# Patient Record
Sex: MALE | URBAN - METROPOLITAN AREA
[De-identification: names, ages, dates, MRNs, and addresses within clinical notes are randomized per-mention and may not be internally consistent; named-entity substitution may affect disease eponyms.]

---

## 2023-03-20 ENCOUNTER — APPOINTMENT (OUTPATIENT)
Dept: URBAN - METROPOLITAN AREA CLINIC 254 | Age: 43
Setting detail: DERMATOLOGY
End: 2023-03-20

## 2023-03-20 VITALS — WEIGHT: 200 LBS | HEIGHT: 70 IN

## 2023-03-20 DIAGNOSIS — L91.0 HYPERTROPHIC SCAR: ICD-10-CM

## 2023-03-20 DIAGNOSIS — L81.0 POSTINFLAMMATORY HYPERPIGMENTATION: ICD-10-CM

## 2023-03-20 PROBLEM — D23.5 OTHER BENIGN NEOPLASM OF SKIN OF TRUNK: Status: ACTIVE | Noted: 2023-03-20

## 2023-03-20 PROBLEM — D23.61 OTHER BENIGN NEOPLASM OF SKIN OF RIGHT UPPER LIMB, INCLUDING SHOULDER: Status: ACTIVE | Noted: 2023-03-20

## 2023-03-20 PROCEDURE — OTHER ADDITIONAL NOTES: OTHER

## 2023-03-20 PROCEDURE — OTHER MIPS QUALITY: OTHER

## 2023-03-20 PROCEDURE — 99202 OFFICE O/P NEW SF 15 MIN: CPT | Mod: 25

## 2023-03-20 PROCEDURE — OTHER COUNSELING: OTHER

## 2023-03-20 PROCEDURE — OTHER INTRALESIONAL KENALOG: OTHER

## 2023-03-20 PROCEDURE — 11900 INJECT SKIN LESIONS </W 7: CPT

## 2023-03-20 ASSESSMENT — LOCATION ZONE DERM
LOCATION ZONE: LEG
LOCATION ZONE: NECK
LOCATION ZONE: TRUNK

## 2023-03-20 ASSESSMENT — LOCATION DETAILED DESCRIPTION DERM
LOCATION DETAILED: RIGHT INFERIOR POSTERIOR NECK
LOCATION DETAILED: RIGHT MEDIAL UPPER BACK
LOCATION DETAILED: RIGHT BUTTOCK
LOCATION DETAILED: RIGHT ANTERIOR PROXIMAL THIGH

## 2023-03-20 ASSESSMENT — LOCATION SIMPLE DESCRIPTION DERM
LOCATION SIMPLE: RIGHT BUTTOCK
LOCATION SIMPLE: POSTERIOR NECK
LOCATION SIMPLE: RIGHT UPPER BACK
LOCATION SIMPLE: RIGHT THIGH

## 2023-03-20 NOTE — PROCEDURE: ADDITIONAL NOTES
Detail Level: Detailed
Additional Notes: - Reviewed steroids will not improve the condition. Explained it may have been folliculitis or eczema previously, but no rash is actively present on today’s exam. \\n- Can continue to utilize triamcinolone cream bid until resolve or no more than 14 days per month for itch.\\n- Reassured hyperpigmentation will fade over time.
Render Risk Assessment In Note?: no

## 2023-03-20 NOTE — HPI: RASH
Is This A New Presentation, Or A Follow-Up?: Rash
Additional History: Was itchy at first but no itch currently. Tried triamcinolone .1% bid for the past 3 months. Also using hydrocortisone 0.2% bid as well.

## 2023-11-29 ENCOUNTER — TRANSCRIBE ORDERS (OUTPATIENT)
Dept: OTHER | Age: 43
End: 2023-11-29

## 2023-11-29 DIAGNOSIS — M25.552 BILATERAL HIP PAIN: Primary | ICD-10-CM

## 2023-11-29 DIAGNOSIS — M25.551 BILATERAL HIP PAIN: Primary | ICD-10-CM

## 2023-11-29 DIAGNOSIS — M65.352 TRIGGER LITTLE FINGER OF LEFT HAND: Primary | ICD-10-CM

## 2023-12-11 NOTE — TELEPHONE ENCOUNTER
Action December 10, 2023 11:06 PM MT   Action Taken Sent a request to  and [facility] for imaging.       DIAGNOSIS: Bilateral Hip Pain - AVN  of RT Femur   APPOINTMENT DATE: 12/28/2023   NOTES STATUS DETAILS   OFFICE NOTE from referring provider Care Everywhere 11/28/2023 - Jese Dwyer MD - [facility]   OFFICE NOTE from other specialist Care Everywhere 11/13/2023 - Jordan Mckay PA-C - [facility]   MEDICATION LIST Care Everywhere    MRI Complete - in PACS [facility]:  11/09/2023 - RT Hip  11/09/2023 - LT Hip   CT SCAN Complete  [facility]:  11/26/2021 - CT Abdomen/Pelvis   ULTRASOUND Complete  [facility]:  01/20/2022 - RT HIP/Superficial Mass   XRAYS (IMAGES & REPORTS) Complete  [facility]:  11/08/2023 - RT Hip/Pelvis

## 2023-12-13 NOTE — TELEPHONE ENCOUNTER
Action 12.19.23    Action Taken Faxed urgent requests to [facility] and [place] for any/all Hand/Finger imaging.    Imaging received and resolved to [facility]       DIAGNOSIS: Trigger little finger of left hand    APPOINTMENT DATE: 12/20/2023   NOTES STATUS DETAILS   OFFICE NOTE from referring provider Care Everywhere Dr. Jese Dwyer  [facility]-11/29/23   OFFICE NOTE from other specialist Care Everywhere [facility]-11/12/2013  Dr. Marcie Romero   DISCHARGE SUMMARY from hospital Care Everywhere [facility]-11/05/2013  Dr. Marcie Romero   OPERATIVE REPORT Care Everywhere [facility]-11/05/2013  Dr. Marcie Romero     MEDICATION LIST Care Everywhere    XRAYS (IMAGES & REPORTS) [facility] Xray- of fingers from [facility] AND [place]

## 2023-12-20 ENCOUNTER — PRE VISIT (OUTPATIENT)
Dept: ORTHOPEDICS | Facility: CLINIC | Age: 43
End: 2023-12-20

## 2023-12-27 DIAGNOSIS — M25.552 LEFT HIP PAIN: Primary | ICD-10-CM
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
## 2023-12-28 ENCOUNTER — ANCILLARY PROCEDURE (OUTPATIENT)
Dept: GENERAL RADIOLOGY | Facility: CLINIC | Age: 43
End: 2023-12-28
Attending: ORTHOPAEDIC SURGERY
Payer: COMMERCIAL

## 2023-12-28 ENCOUNTER — PRE VISIT (OUTPATIENT)
Dept: ORTHOPEDICS | Facility: CLINIC | Age: 43
End: 2023-12-28

## 2023-12-28 ENCOUNTER — LAB (OUTPATIENT)
Dept: LAB | Facility: CLINIC | Age: 43
End: 2023-12-28
Payer: COMMERCIAL

## 2023-12-28 ENCOUNTER — ANCILLARY PROCEDURE (OUTPATIENT)
Dept: CT IMAGING | Facility: CLINIC | Age: 43
End: 2023-12-28
Attending: ORTHOPAEDIC SURGERY
Payer: COMMERCIAL

## 2023-12-28 ENCOUNTER — OFFICE VISIT (OUTPATIENT)
Dept: ORTHOPEDICS | Facility: CLINIC | Age: 43
End: 2023-12-28
Attending: SPECIALIST
Payer: COMMERCIAL

## 2023-12-28 VITALS — WEIGHT: 204 LBS

## 2023-12-28 DIAGNOSIS — M87.9 OSTEONECROSIS (H): Primary | ICD-10-CM

## 2023-12-28 DIAGNOSIS — M25.551 BILATERAL HIP PAIN: ICD-10-CM

## 2023-12-28 DIAGNOSIS — M25.552 BILATERAL HIP PAIN: ICD-10-CM

## 2023-12-28 DIAGNOSIS — M25.552 LEFT HIP PAIN: ICD-10-CM

## 2023-12-28 PROBLEM — G47.33 OSA (OBSTRUCTIVE SLEEP APNEA): Status: ACTIVE | Noted: 2023-02-17

## 2023-12-28 PROBLEM — R22.0 SWELLING OF HEAD: Status: ACTIVE | Noted: 2023-12-28

## 2023-12-28 LAB
DRVVT SCREEN RATIO: 1.04
FACT IX ACT/NOR PPP: 120 %
FACT VIII ACT/NOR PPP: 92 % (ref 55–200)
HCYS SERPL-SCNC: 19.6 UMOL/L (ref 0–15)
INR PPP: 1.02 (ref 0.85–1.15)
LA PPP-IMP: NEGATIVE
LUPUS INTERPRETATION: NORMAL
PTT RATIO: 1.19
THROMBIN TIME: 18.1 SECONDS (ref 13–19)

## 2023-12-28 PROCEDURE — 83090 ASSAY OF HOMOCYSTEINE: CPT | Performed by: ORTHOPAEDIC SURGERY

## 2023-12-28 PROCEDURE — 73501 X-RAY EXAM HIP UNI 1 VIEW: CPT | Mod: LT | Performed by: RADIOLOGY

## 2023-12-28 PROCEDURE — 85240 CLOT FACTOR VIII AHG 1 STAGE: CPT | Performed by: ORTHOPAEDIC SURGERY

## 2023-12-28 PROCEDURE — 86147 CARDIOLIPIN ANTIBODY EA IG: CPT | Performed by: ORTHOPAEDIC SURGERY

## 2023-12-28 PROCEDURE — 81240 F2 GENE: CPT

## 2023-12-28 PROCEDURE — 85250 CLOT FACTOR IX PTC/CHRSTMAS: CPT | Performed by: ORTHOPAEDIC SURGERY

## 2023-12-28 PROCEDURE — 85390 FIBRINOLYSINS SCREEN I&R: CPT | Mod: 26 | Performed by: PATHOLOGY

## 2023-12-28 PROCEDURE — 72192 CT PELVIS W/O DYE: CPT | Mod: GC | Performed by: RADIOLOGY

## 2023-12-28 PROCEDURE — 99000 SPECIMEN HANDLING OFFICE-LAB: CPT | Performed by: PATHOLOGY

## 2023-12-28 PROCEDURE — G0452 MOLECULAR PATHOLOGY INTERPR: HCPCS | Mod: 26 | Performed by: PATHOLOGY

## 2023-12-28 PROCEDURE — 85420 FIBRINOLYTIC PLASMINOGEN: CPT | Performed by: ORTHOPAEDIC SURGERY

## 2023-12-28 PROCEDURE — 36415 COLL VENOUS BLD VENIPUNCTURE: CPT | Performed by: PATHOLOGY

## 2023-12-28 PROCEDURE — 99204 OFFICE O/P NEW MOD 45 MIN: CPT | Performed by: ORTHOPAEDIC SURGERY

## 2023-12-28 PROCEDURE — 85613 RUSSELL VIPER VENOM DILUTED: CPT | Performed by: ORTHOPAEDIC SURGERY

## 2023-12-28 PROCEDURE — 85306 CLOT INHIBIT PROT S FREE: CPT | Performed by: ORTHOPAEDIC SURGERY

## 2023-12-28 PROCEDURE — 85303 CLOT INHIBIT PROT C ACTIVITY: CPT | Performed by: ORTHOPAEDIC SURGERY

## 2023-12-28 RX ORDER — CELECOXIB 200 MG/1
200 CAPSULE ORAL 2 TIMES DAILY
Qty: 60 CAPSULE | Refills: 0 | Status: SHIPPED | OUTPATIENT
Start: 2023-12-28 | End: 2024-02-15

## 2023-12-28 ASSESSMENT — ACTIVITIES OF DAILY LIVING (ADL)
ADL_MEAN: 1.71
ADL_SUBSCALE_SCORE: 57.25
ADL_SUM: 0
ADL_COUNT: 17
ADL_MEAN: 0
ADL_SUM: 29
ADL_COUNT: 17
ADL_SUBSCALE_SCORE: 100

## 2023-12-28 ASSESSMENT — HOOS S4: HOW SEVERE IS YOUR HIP JOINT STIFFNESS AFTER FIRST WAKENING IN THE MORNING?: SEVERE

## 2023-12-29 LAB
PLG ACT/NOR PPP CHRO: 109 % (ref 80–133)
PROT C ACT/NOR PPP CHRO: 109 % (ref 70–170)
PROT S FREE AG ACT/NOR PPP IA: 89 % (ref 70–148)

## 2024-01-02 LAB
CARDIOLIPIN IGG SER IA-ACNC: 20 GPL-U/ML
CARDIOLIPIN IGG SER IA-ACNC: ABNORMAL
CARDIOLIPIN IGM SER IA-ACNC: 22 MPL-U/ML
CARDIOLIPIN IGM SER IA-ACNC: ABNORMAL

## 2024-01-10 NOTE — PROGRESS NOTES
[facility]  Orthopaedic Surgery, Joint Replacement Consultation  by Kenyon Ayala M.D.    Neil Pompa MRN# [id number]    YOB: 1980     Requesting physician: Jese Waterman     Background history:  DX:  Osteonecrosis of the femoral head, bilaterally, Arco stage II, greater than 30% of femoral head involvement bilaterally.  Poss related to HIV medication (protease inhibitors).      TREATMENTS:  11/5/2013, Right ring finger trigger finger release and right ring finger mass excision, (NABEEL Romero), [facility]  5/31/2014, Right C6-C7 hemilaminectomy and microdiskectomy, (Roxanna PATRICK) [facility]  11/3/3021, Right forehead soft tissue mass excision, ([facility]    Virtual visit today to review CT scan and laboratory studies investigating etiology of osteonecrosis.  He notes that he is also having right knee pain.  He also requested renewal of narcotic pain medicine which I declined.  I advised use of anti-inflammatory agents instead.    CT scans very large necrotic arc of involvement but no evidence of subchondral fracture or collapse.    Investigations into thrombophilia and hypofibrinolysis show a number of abnormalities that may or may not be of clinical significance.  I will refer the patient to our blood clotting disorders clinic for further discussion regarding these issues and lab test results.           Assessment and Plan:   Assessment and Plan:  Osteonecrosis bilateral femoral heads greater than 30% involvement bilaterally.  Arco stage 2 bilaterally.  Etiology unknown but may be related to HIV medication.  No significant history of steroid or ethanol exposure.  Advise proceeding with core decompression and bone marrow aspirate concentrate bilaterally.  He is a candidate potentially for the BAT ON multisite randomized trial of bone marrow aspirate concentrate clinical trial study.  He is willing to participate.  He is also candidate for the MOS T  MRI clinical trial study.  He would like to participate as well.  I will have our research  contact patient for consent process.  Patients may be on both trials concurrently as they are not mutually exclusive.  As patient is having pain in his right knee joint, I will arrange for MRI examination of both knees to screen for osteonecrosis.  Given the abnormal blood clotting lab test, staff will arrange for hematology referral consultation to review test and determine any clinical significance.  HIV positive, on genvoya x 8 years    Kenyon Ayala MD  [facility] Family Professor  Oncology and Adult Reconstructive Surgery  Dept Orthopaedic Surgery, [facility]  [phone] office, [phone] pager  [email or web address]    .      Virtual-Visit Details    Type of service:  Video Visit  Visit total duration (including visit time, pre and post visit work time as documented above on the same day of service): 20  min  Video start time: 1625  Video end time:  1645  Non video work time: 05 min  Originating Location (pt. Location): Home  Distant Location (provider location):  [facility]   Platform used for Virtual Visit: [employer]

## 2024-01-11 ENCOUNTER — VIRTUAL VISIT (OUTPATIENT)
Dept: ORTHOPEDICS | Facility: CLINIC | Age: 44
End: 2024-01-11
Payer: COMMERCIAL

## 2024-01-11 VITALS — HEIGHT: 70 IN | BODY MASS INDEX: 29.27 KG/M2

## 2024-01-11 DIAGNOSIS — M87.9 OSTEONECROSIS (H): Primary | ICD-10-CM

## 2024-01-11 PROCEDURE — 99214 OFFICE O/P EST MOD 30 MIN: CPT | Mod: VID | Performed by: ORTHOPAEDIC SURGERY

## 2024-01-11 ASSESSMENT — PAIN SCALES - GENERAL: PAINLEVEL: EXTREME PAIN (8)

## 2024-01-11 NOTE — NURSING NOTE
Is the patient currently in the state of [place]? YES    Visit mode:VIDEO    If the visit is dropped, the patient can be reconnected by: VIDEO VISIT: Text to cell phone:   Telephone Information:   Mobile [phone]       Will anyone else be joining the visit? NO  (If patient encounters technical issues they should call [phone] :[id number])    How would you like to obtain your AVS? MyChart    Are changes needed to the allergy or medication list? No    Reason for visit: RECHECK    Medications and allergies have been reviewed.     Len FISHER

## 2024-01-11 NOTE — LETTER
1/11/2024         RE: Neil Pompa  [address]  [place] [address]        Dear Colleague,    Thank you for referring your patient, Neil Pompa, to the [facility] [place]. Please see a copy of my visit note below.        [facility]  Orthopaedic Surgery, Joint Replacement Consultation  by Kenyon Ayala M.D.    Neil Pompa MRN# [id number]    YOB: 1980     Requesting physician: Jese Waterman     Background history:  DX:  Osteonecrosis of the femoral head, bilaterally, Arco stage II, greater than 30% of femoral head involvement bilaterally.  Poss related to HIV medication (protease inhibitors).      TREATMENTS:  11/5/2013, Right ring finger trigger finger release and right ring finger mass excision, (NABEEL Romeor[place] [facility]  5/31/2014, Right C6-C7 hemilaminectomy and microdiskectomy, (Roxanna PATRICK) [facility]  11/3/3021, Right forehead soft tissue mass excision, (MADELIN [facility]    Virtual visit today to review CT scan and laboratory studies investigating etiology of osteonecrosis.  He notes that he is also having right knee pain.  He also requested renewal of narcotic pain medicine which I declined.  I advised use of anti-inflammatory agents instead.    CT scans very large necrotic arc of involvement but no evidence of subchondral fracture or collapse.    Investigations into thrombophilia and hypofibrinolysis show a number of abnormalities that may or may not be of clinical significance.  I will refer the patient to our blood clotting disorders clinic for further discussion regarding these issues and lab test results.           Assessment and Plan:   Assessment and Plan:  Osteonecrosis bilateral femoral heads greater than 30% involvement bilaterally.  Arco stage 2 bilaterally.  Etiology unknown but may be related to HIV medication.  No significant history of steroid or ethanol exposure.  Advise proceeding with  core decompression and bone marrow aspirate concentrate bilaterally.  He is a candidate potentially for the BAT ON multisite randomized trial of bone marrow aspirate concentrate clinical trial study.  He is willing to participate.  He is also candidate for the MOS T MRI clinical trial study.  He would like to participate as well.  I will have our research  contact patient for consent process.  Patients may be on both trials concurrently as they are not mutually exclusive.  As patient is having pain in his right knee joint, I will arrange for MRI examination of both knees to screen for osteonecrosis.  Given the abnormal blood clotting lab test, staff will arrange for hematology referral consultation to review test and determine any clinical significance.  HIV positive, on genvoya x 8 years    Kenyon Ayala MD  [facility] Family Professor  Oncology and Adult Reconstructive Surgery  Dept Orthopaedic Surgery, [facility]  [phone] office, [phone] pager  [email or web address]    .      Virtual-Visit Details    Type of service:  Video Visit  Visit total duration (including visit time, pre and post visit work time as documented above on the same day of service): 20  min  Video start time: 1625  Video end time:  1645  Non video work time: 05 min  Originating Location (pt. Location): Home  Distant Location (provider location):  [facility]THOPEDIC [facility]   Platform used for Virtual Visit: [employer]

## 2024-01-11 NOTE — PROGRESS NOTES
"Virtual Visit Details    Type of service:  Video Visit   Video Start Time: {video visit start/end time for provider to select:257017}  Video End Time:{video visit start/end time for provider to select:237080}    Originating Location (pt. Location): {video visit patient location:203899::\"Home\"}  {PROVIDER LOCATION On-site should be selected for visits conducted from your clinic location or adjoining [facility] hospital, academic office, or other nearby [facility] building. Off-site should be selected for all other provider locations, including home:914332}  Distant Location (provider location):  {virtual location provider:167399}  Platform used for Video Visit: {Virtual Visit Platforms:553987::\"[employer]\"}    "

## 2024-01-12 ENCOUNTER — TELEPHONE (OUTPATIENT)
Dept: ORTHOPEDICS | Facility: CLINIC | Age: 44
End: 2024-01-12
Payer: COMMERCIAL

## 2024-01-12 NOTE — TELEPHONE ENCOUNTER
Left Voicemail (1st Attempt) for the patient to call back and schedule the following:    Appointment type: Virtual Return  Provider: Jamie  Return date: Next available after MRI's are done  Specialty phone number: [phone]  Additional appointment(s) needed: 2 MRI's  Additonal Notes:

## 2024-01-15 ENCOUNTER — TELEPHONE (OUTPATIENT)
Dept: ORTHOPEDICS | Facility: CLINIC | Age: 44
End: 2024-01-15
Payer: COMMERCIAL

## 2024-01-15 NOTE — TELEPHONE ENCOUNTER
ANTONINA to schedule 2 mris and a virtual visit with Jamie to go over the results. Contact info provided. MAX attempts made to schedule

## 2024-01-16 LAB
FACTOR 2 INTERPRETATION: NORMAL
FACTOR V INTERPRETATION: NORMAL
LAB DIRECTOR COMMENTS: NORMAL
LAB DIRECTOR DISCLAIMER: NORMAL
LAB DIRECTOR INTERPRETATION: NORMAL
LAB DIRECTOR METHODOLOGY: NORMAL
LAB DIRECTOR RESULTS: NORMAL

## 2024-02-14 ENCOUNTER — TELEPHONE (OUTPATIENT)
Dept: ORTHOPEDICS | Facility: CLINIC | Age: 44
End: 2024-02-14
Payer: COMMERCIAL

## 2024-02-14 DIAGNOSIS — M25.552 BILATERAL HIP PAIN: ICD-10-CM

## 2024-02-14 DIAGNOSIS — M25.551 BILATERAL HIP PAIN: ICD-10-CM

## 2024-02-14 DIAGNOSIS — M87.9 OSTEONECROSIS (H): ICD-10-CM

## 2024-02-14 NOTE — TELEPHONE ENCOUNTER
[facility] Call Center    Phone Message    May a detailed message be left on voicemail: yes     Reason for Call: Medication Refill Request    Has the patient contacted the pharmacy for the refill? Yes   Name of medication being requested: Celebrex  Provider who prescribed the medication: Dr. Ayala  Pharmacy: [facility] on [address] in [place]  Date medication is needed: as soon as possible     Going out of town and needs refill as soon as you can.    Action Taken: Message routed to:  [facility] ortho    Travel Screening: Not Applicable

## 2024-02-15 RX ORDER — CELECOXIB 200 MG/1
200 CAPSULE ORAL 2 TIMES DAILY
Qty: 60 CAPSULE | Refills: 0 | Status: SHIPPED | OUTPATIENT
Start: 2024-02-15 | End: 2024-03-25

## 2024-02-15 NOTE — TELEPHONE ENCOUNTER
- A call was placed to the patient.     - I told him we would refill medication one more time then we ask that he reach out to his PCP for future prescriptions.     - Patient verbalized understanding of plan and all questions were answered. Call back number to clinic was given and patient was told to call if they had an further questions.

## 2024-03-14 DIAGNOSIS — M25.551 BILATERAL HIP PAIN: ICD-10-CM

## 2024-03-14 DIAGNOSIS — M25.552 BILATERAL HIP PAIN: ICD-10-CM

## 2024-03-14 DIAGNOSIS — M87.9 OSTEONECROSIS (H): ICD-10-CM

## 2024-03-15 RX ORDER — CELECOXIB 200 MG/1
200 CAPSULE ORAL 2 TIMES DAILY
Qty: 60 CAPSULE | Refills: 0 | OUTPATIENT
Start: 2024-03-15

## 2024-03-15 NOTE — TELEPHONE ENCOUNTER
- A call was placed to the patient.     - He said he did not request this medication. I let him know that if he needed refills moving forward that he should reach out to his primary care provider for ongoing prescriptions.  He agreed to do so.    - Patient verbalized understanding of plan and all questions were answered. Call back number to clinic was given and patient was told to call if they had an further questions.

## 2024-03-15 NOTE — PROGRESS NOTES
[facility]  [address] [place] [id number]  Main: [phone], Fax: [phone]    Patient seen at: [facility] at [address] [place] [address]    Outpatient Visit Note:    Patient: Neil Pompa  MRN: [id number]  : 1980  MASON: 2024  Location of this writer at the time of this clinic visit was conducted: [facility].  Location of the patient at the time of this clinic visit was conducted: [facility].     Reason for visit:  Positive cardiolipin Abys.     HPI:  Neil is a 43 year old male with a history of HIV infection, depression, MRSA in , and anxiety disorder, who recently is found to have osteonecrosis of the femoral head bilaterally that might be related to HIV medications with protease inhibitors, referred by Dr. Kenyon Ayala of orthopedic surgery for consultation in regard to positive Cardiolipin IgG and IgM antibodies.     Neil started to experience some right hip pain back around Sept / Oct 2023 that became worse. He then was seen by Dr. Kenyon Ayala on 2023 after MRI in 2023 showed osteonecrosis of both femoral head. At the time, Dr. Ayala performed a thrombophilia workup for possible etiology for his osteonecrosis. The result of this workup is described in the lab section of this note. Specifically, this workup showed that his Cardiolipin IgG at 20.0 (weakly positive. Normal <10.0) and Cardiolipin IgM at 22.0 (weakly positive. Normal <10.0). For this reason, the patient is referred to our clinic for consultation.     Neil has no personal history of venous or arterial thrombosis.     ROS:  Denies any bleeding complications. Specifically, no frequent epistaxis. No issues with oral mucosal bleeding. Denies any hematuria or blood in stools. Denies any shortness of breath. No  chest pain. No cough. No fever.      Medications:  Current Outpatient Medications   Medication    celecoxib (CELEBREX) 200 MG capsule    clobetasol (TEMOVATE) 0.05 % cream    ibuprofen (IBU) 800 MG tablet     No current facility-administered medications for this visit.     Allergies:   No Known Allergies    PmHx:  As above.     Social History:   Deferred.    Family History:  Deferred.    Objective:  Vitals: /78 (BP Location: Right arm, Patient Position: Sitting, Cuff Size: Adult Large)   Pulse 72   Temp 98.1  F (36.7  C) (Oral)   Wt 92.1 kg (203 lb)   SpO2 98%   BMI 29.13 kg/m    Exam:   Complete exam is not performed today.       Labs:  Component      Latest Ref Rng 12/28/2023  10:55 AM   INR      0.85 - 1.15  1.02    Thrombin Time      13.0 - 19.0 Seconds 18.1    PTT Ratio      <1.21  1.19    DRVVT Screen Ratio      <1.08  1.04    Lupus Result      Negative  Negative    Lupus Interpretation The INR is normal.     Cardiolipin Malinda IgG Instrument Value      <10.0 GPL-U/mL 20.0 (H)    Cardiolipin IgG Malinda      Negative  Weak Positive !    Cardiolipin Malinda IgM Instrument Value      <10.0 MPL-U/mL 22.0 (H)    Cardiolipin IgM Malinda      Negative  Weak Positive !    Plasminogen Activity      80 - 133 % 109    Protein S Antigen Free      70 - 148 % 89    Prot C Chromogenic      70 - 170 % 109    Homocysteine umol/L      0.0 - 15.0 umol/L 19.6 (H)    Factor 9 Assay      65 - 150 % 120    Factor 8 Assay      55 - 200 % 92    Factor V Leiden mutation  Negative   Prothrombin Gene Mutation  Negative.       Assessment:  In summary, Neil is a 43 year old male with a history of HIV infection, depression, MRSA in 2012, and anxiety disorder, who recently is found to have osteonecrosis of the femoral head bilaterally that might be related to HIV medications with protease inhibitors, referred by Dr. Kenyon Ayala of orthopedic surgery for consultation in regard to positive Cardiolipin IgG and IgM antibodies. As mentioned  "above, Neil has no history of venous thromboembolism and no other history of arterial thrombosis other than his finding of osteonecrosis of the femoral head that might be related to his protease inhibitors use.     Persistently positive Cardiolipin Abys can be part of the diagnostic criteria for antiphospholipid antibody syndrome (APS). According to the most recent \"The 2023 ACR (American College of rheumatology) / EULAR ( league against rheumatism) Antiphospholipid Syndrome Classification Criteria, although not specifically listed, arterial thrombosis can be classified as organ infarcts in the absence of visualized thrombus. So based on this explanation, avascular necrosis could be considered as \"organ infarcts\". However, the ACR/EULAR criteria specifically indicated that \"Do not count a clinical criterion if there is an equally or more likely explanation than APS.\" In this particular case, there is a more likely explanation that causes his osteonecrosis, which is his HIV medication. Thus based on this newest criteria, there are no indications to perform APS testing as he does not meet any of the clinical criteria.     Also in accordance to current ACR/EULAR APS criteria, his cardiolipin IgG and IgM titers in their 20's are considered clinically not significant. The ACR/EULAR APS criteria consider clinically significant titer at >40.    Also his mildly elevated homocysteine level is clinically not significant. Usually we considered clinically significant homocysteine level at >50. Recent studies show that lowering homocysteine levels have not been shown to prevent cardiovascular disease or venous thromboembolism. And if lowering homocysteine level is desired, one can simply take a folic acid supplement daily.     Diagnosis:  Weakly positive Cardiolipin IgG and IgM Abys.   Mildly elevated homocysteine level.   HIV infection. Well controlled.   Osteonecrosis of both hips.     Plan:  Today, I spent some time " to explain the ACR/EULAR APS criteria to the patient. Also educated him on cardiolipin Abys. As explain above, he does NOT meet the diagnostic criteria for APS and thus there are no indications for anticoagulation or antiplatelet therapy. Additionally, I do not feel that there are any indications to repeat his Cardiolipin Malinda testing or his homocysteine level.     He is cleared from a hematological standpoint to proceed with hip surgery if Dr. Ayala found that he is a surgical candidate. We recommend routine DVT/PE prophylaxis per usual protocol.     The patient is given our clinic's contact information and is instructed to call if he should have any further questions or concerns.   I have no further plans to see this patient back on a routine basis.     Thank you for letting us to participate in this patient's care.       Juanito Campbell PA-C, MPAS  Physician Assistant  [facility].     30 minutes spent by me on the date of the encounter doing chart review, history and exam, documentation and further activities per the note.    Time IN: 15:20  Time OUT: 15:40

## 2024-03-22 ENCOUNTER — TELEPHONE (OUTPATIENT)
Dept: ORTHOPEDICS | Facility: CLINIC | Age: 44
End: 2024-03-22
Payer: COMMERCIAL

## 2024-03-22 NOTE — TELEPHONE ENCOUNTER
- A call was placed to the patient.     - I gave him the number for imaging scheduling to schedule imaging. I asked him to tamara back after to schedule follow up appointment in person or virtual with Dr. Ayala.      - Patient verbalized understanding of plan and all questions were answered. Call back number to clinic was given and patient was told to call if they had an further questions.

## 2024-03-25 ENCOUNTER — OFFICE VISIT (OUTPATIENT)
Dept: HEMATOLOGY | Facility: CLINIC | Age: 44
End: 2024-03-25
Attending: ORTHOPAEDIC SURGERY
Payer: COMMERCIAL

## 2024-03-25 ENCOUNTER — ANCILLARY PROCEDURE (OUTPATIENT)
Dept: MRI IMAGING | Facility: CLINIC | Age: 44
End: 2024-03-25
Attending: ORTHOPAEDIC SURGERY
Payer: COMMERCIAL

## 2024-03-25 VITALS
DIASTOLIC BLOOD PRESSURE: 78 MMHG | WEIGHT: 203 LBS | BODY MASS INDEX: 29.13 KG/M2 | TEMPERATURE: 98.1 F | HEART RATE: 72 BPM | OXYGEN SATURATION: 98 % | SYSTOLIC BLOOD PRESSURE: 122 MMHG

## 2024-03-25 DIAGNOSIS — R76.0 ANTI-CARDIOLIPIN ANTIBODY POSITIVE: Primary | ICD-10-CM

## 2024-03-25 DIAGNOSIS — M25.551 BILATERAL HIP PAIN: ICD-10-CM

## 2024-03-25 DIAGNOSIS — M87.9 OSTEONECROSIS (H): ICD-10-CM

## 2024-03-25 DIAGNOSIS — R79.89 ELEVATED HOMOCYSTEINE: ICD-10-CM

## 2024-03-25 DIAGNOSIS — M25.552 BILATERAL HIP PAIN: ICD-10-CM

## 2024-03-25 DIAGNOSIS — Z21 HIV POSITIVE (H): ICD-10-CM

## 2024-03-25 PROCEDURE — 99203 OFFICE O/P NEW LOW 30 MIN: CPT | Performed by: PHYSICIAN ASSISTANT

## 2024-03-25 PROCEDURE — G0463 HOSPITAL OUTPT CLINIC VISIT: HCPCS | Performed by: PHYSICIAN ASSISTANT

## 2024-03-25 PROCEDURE — 73721 MRI JNT OF LWR EXTRE W/O DYE: CPT | Mod: RT | Performed by: RADIOLOGY

## 2024-03-25 PROCEDURE — 73721 MRI JNT OF LWR EXTRE W/O DYE: CPT | Mod: LT | Performed by: RADIOLOGY

## 2024-03-25 RX ORDER — MOMETASONE FUROATE MONOHYDRATE 50 UG/1
2 SPRAY, METERED NASAL PRN
COMMUNITY
Start: 2024-03-01

## 2024-03-25 RX ORDER — ELVITEGRAVIR, COBICISTAT, EMTRICITABINE, AND TENOFOVIR ALAFENAMIDE 150; 150; 200; 10 MG/1; MG/1; MG/1; MG/1
1 TABLET ORAL EVERY MORNING
COMMUNITY

## 2024-03-25 RX ORDER — CELECOXIB 200 MG/1
200 CAPSULE ORAL 2 TIMES DAILY
COMMUNITY

## 2024-03-25 NOTE — LETTER
[facility]  [address] [place], [place] [id number]  Main: [phone], Fax: [phone]    Patient seen at: [facility] at [address] [place] [address]    Outpatient Visit Note:    Patient: Neil Pompa  MRN: [id number]  : 1980  MASON: 2024  Location of this writer at the time of this clinic visit was conducted: [facility].  Location of the patient at the time of this clinic visit was conducted: [facility].     Reason for visit:  Positive cardiolipin Abys.     HPI:  Neil is a 43 year old male with a history of HIV infection, depression, MRSA in , and anxiety disorder, who recently is found to have osteonecrosis of the femoral head bilaterally that might be related to HIV medications with protease inhibitors, referred by Dr. Kenyon Ayala of orthopedic surgery for consultation in regard to positive Cardiolipin IgG and IgM antibodies.     Neil started to experience some right hip pain back around Sept / Oct 2023 that became worse. He then was seen by Dr. Kenyon Ayala on 2023 after MRI in 2023 showed osteonecrosis of both femoral head. At the time, Dr. Ayala performed a thrombophilia workup for possible etiology for his osteonecrosis. The result of this workup is described in the lab section of this note. Specifically, this workup showed that his Cardiolipin IgG at 20.0 (weakly positive. Normal <10.0) and Cardiolipin IgM at 22.0 (weakly positive. Normal <10.0). For this reason, the patient is referred to our clinic for consultation.     Neil has no personal history of venous or arterial thrombosis.     ROS:  Denies any bleeding complications. Specifically, no frequent epistaxis. No issues with oral mucosal bleeding. Denies any hematuria or blood in stools. Denies any shortness of breath.  No chest pain. No cough. No fever.      Medications:  Current Outpatient Medications   Medication     celecoxib (CELEBREX) 200 MG capsule     clobetasol (TEMOVATE) 0.05 % cream     ibuprofen (IBU) 800 MG tablet     No current facility-administered medications for this visit.     Allergies:   No Known Allergies    PmHx:  As above.     Social History:   Deferred.    Family History:  Deferred.    Objective:  Vitals: /78 (BP Location: Right arm, Patient Position: Sitting, Cuff Size: Adult Large)   Pulse 72   Temp 98.1  F (36.7  C) (Oral)   Wt 92.1 kg (203 lb)   SpO2 98%   BMI 29.13 kg/m    Exam:   Complete exam is not performed today.       Labs:  Component      Latest Ref Rng 12/28/2023  10:55 AM   INR      0.85 - 1.15  1.02    Thrombin Time      13.0 - 19.0 Seconds 18.1    PTT Ratio      <1.21  1.19    DRVVT Screen Ratio      <1.08  1.04    Lupus Result      Negative  Negative    Lupus Interpretation The INR is normal.     Cardiolipin Malinda IgG Instrument Value      <10.0 GPL-U/mL 20.0 (H)    Cardiolipin IgG Malinda      Negative  Weak Positive !    Cardiolipin Malinda IgM Instrument Value      <10.0 MPL-U/mL 22.0 (H)    Cardiolipin IgM Malinda      Negative  Weak Positive !    Plasminogen Activity      80 - 133 % 109    Protein S Antigen Free      70 - 148 % 89    Prot C Chromogenic      70 - 170 % 109    Homocysteine umol/L      0.0 - 15.0 umol/L 19.6 (H)    Factor 9 Assay      65 - 150 % 120    Factor 8 Assay      55 - 200 % 92    Factor V Leiden mutation  Negative   Prothrombin Gene Mutation  Negative.       Assessment:  In summary, Neil is a 43 year old male with a history of HIV infection, depression, MRSA in 2012, and anxiety disorder, who recently is found to have osteonecrosis of the femoral head bilaterally that might be related to HIV medications with protease inhibitors, referred by Dr. Kenyon Ayala of orthopedic surgery for consultation in regard to positive Cardiolipin IgG and IgM antibodies. As mentioned  "above, Neil has no history of venous thromboembolism and no other history of arterial thrombosis other than his finding of osteonecrosis of the femoral head that might be related to his protease inhibitors use.     Persistently positive Cardiolipin Abys can be part of the diagnostic criteria for antiphospholipid antibody syndrome (APS). According to the most recent \"The 2023 ACR (American College of rheumatology) / EULAR ( league against rheumatism) Antiphospholipid Syndrome Classification Criteria, although not specifically listed, arterial thrombosis can be classified as organ infarcts in the absence of visualized thrombus. So based on this explanation, avascular necrosis could be considered as \"organ infarcts\". However, the ACR/EULAR criteria specifically indicated that \"Do not count a clinical criterion if there is an equally or more likely explanation than APS.\" In this particular case, there is a more likely explanation that causes his osteonecrosis, which is his HIV medication. Thus based on this newest criteria, there are no indications to perform APS testing as he does not meet any of the clinical criteria.     Also in accordance to current ACR/EULAR APS criteria, his cardiolipin IgG and IgM titers in their 20's are considered clinically not significant. The ACR/EULAR APS criteria consider clinically significant titer at >40.    Also his mildly elevated homocysteine level is clinically not significant. Usually we considered clinically significant homocysteine level at >50. Recent studies show that lowering homocysteine levels have not been shown to prevent cardiovascular disease or venous thromboembolism. And if lowering homocysteine level is desired, one can simply take a folic acid supplement daily.     Diagnosis:  Weakly positive Cardiolipin IgG and IgM Abys.   Mildly elevated homocysteine level.   HIV infection. Well controlled.   Osteonecrosis of both hips.     Plan:  Today, I spent some time " to explain the ACR/EULAR APS criteria to the patient. Also educated him on cardiolipin Abys. As explain above, he does NOT meet the diagnostic criteria for APS and thus there are no indications for anticoagulation or antiplatelet therapy. Additionally, I do not feel that there are any indications to repeat his Cardiolipin Malinda testing or his homocysteine level.     He is cleared from a hematological standpoint to proceed with hip surgery if Dr. Ayala found that he is a surgical candidate. We recommend routine DVT/PE prophylaxis per usual protocol.     The patient is given our clinic's contact information and is instructed to call if he should have any further questions or concerns.   I have no further plans to see this patient back on a routine basis.     Thank you for letting us to participate in this patient's care.       Juanito Campbell PA-C, MPAS  Physician Assistant  [facility].     30 minutes spent by me on the date of the encounter doing chart review, history and exam, documentation and further activities per the note.    Time IN: 15:20  Time OUT: 15:40

## 2024-03-29 NOTE — PROGRESS NOTES
[facility]  Orthopaedic Surgery, Joint Replacement Consultation  by Kenyon Ayala M.D.    Neil Pompa MRN# [id number]    YOB: 1980     Requesting physician: Jese Waterman     Background history:  DX:  Osteonecrosis of the femoral head, bilaterally, Arco stage II, greater than 30% of femoral head involvement bilaterally.  Poss related to HIV medication (protease inhibitors).  Knees, no osteonecrosis.    TREATMENTS:  11/5/2013, Right ring finger trigger finger release and right ring finger mass excision, (NABEEL Romero), [facility]  5/31/2014, Right C6-C7 hemilaminectomy and microdiskectomy, (Roxanna PATRICK) [facility]  11/3/3021, Right forehead soft tissue mass excision, VIVIANA Medina [facility]    Virtual visit today to review findings of knee MRI examinations as well has his hematology consultation at the blood coagulation clinic.    The clotting lab abnormalities were not deemed severe enough to warrant further investigation or treatment with any type of medication.    MRI examination from today demonstrates no evidence of osteonecrosis involving his distal femur or proximal tibia of either knee joint.    Patient notes he is having additional and increased pain in his hips.    Impression and plan:  We again discussed his candidacy for the MOS T and BAT ON trials for osteonecrosis.  He is a candidate for both trials.  However, given the increased hip pain he is experiencing I asked him to repeat hip films to ensure that a subchondral fracture is not developed since the last x-rays of November 2023.  Provided there is been no change, then he remains a candidate for both trials.  I will have our  Radha contact the patient to explain the enrollment process.    We also discussed the nature of the core decompression surgery with bone marrow derived concentrate grafting procedure.  We discussed the expected outcomes along with risk benefits  alternatives.  Patient is interested in proceeding with this procedure as long as he remains eligible.      Kenyon Ayala MD [facility] Family Professor  Oncology and Adult Reconstructive Surgery  Dept Orthopaedic Surgery, [facility]  [phone] office, [phone] pager  [email or web address]        Virtual-Visit Details    Type of service:  Video Visit  Visit total duration (including visit time, pre and post visit work time as documented above on the same day of service): 20  min  Video start time: 1605  Video end time:  1620  Non video work time 10  Originating Location (pt. Location): Home  Distant Location (provider location):  [facility]   Platform used for Virtual Visit: [employer]

## 2024-04-01 ENCOUNTER — VIRTUAL VISIT (OUTPATIENT)
Dept: ORTHOPEDICS | Facility: CLINIC | Age: 44
End: 2024-04-01
Payer: MEDICAID

## 2024-04-01 VITALS — WEIGHT: 202 LBS | HEIGHT: 70 IN | BODY MASS INDEX: 28.92 KG/M2

## 2024-04-01 DIAGNOSIS — M25.552 BILATERAL HIP PAIN: Primary | ICD-10-CM

## 2024-04-01 DIAGNOSIS — M25.551 BILATERAL HIP PAIN: Primary | ICD-10-CM

## 2024-04-01 PROCEDURE — 99213 OFFICE O/P EST LOW 20 MIN: CPT | Mod: 95 | Performed by: ORTHOPAEDIC SURGERY

## 2024-04-01 ASSESSMENT — PAIN SCALES - GENERAL: PAINLEVEL: SEVERE PAIN (7)

## 2024-04-01 ASSESSMENT — PATIENT HEALTH QUESTIONNAIRE - PHQ9: SUM OF ALL RESPONSES TO PHQ QUESTIONS 1-9: 3

## 2024-04-01 NOTE — LETTER
4/1/2024         RE: Neil Pompa  [address]  [place] [address]        Dear Colleague,    Thank you for referring your patient, Neil Pompa, to the [facility] [place]. Please see a copy of my visit note below.            [facility]  Orthopaedic Surgery, Joint Replacement Consultation  by Kenyon Ayala M.D.    Neil Pompa MRN# [id number]    YOB: 1980     Requesting physician: Jese Waterman     Background history:  DX:  Osteonecrosis of the femoral head, bilaterally, Arco stage II, greater than 30% of femoral head involvement bilaterally.  Poss related to HIV medication (protease inhibitors).  Knees, no osteonecrosis.    TREATMENTS:  11/5/2013, Right ring finger trigger finger release and right ring finger mass excision, (NABEEL Romero), [facility]  5/31/2014, Right C6-C7 hemilaminectomy and microdiskectomy, (Roxanna PATRICK) [facility]  11/3/3021, Right forehead soft tissue mass excision, VIVIANA Medina, [facility]    Virtual visit today to review findings of knee MRI examinations as well has his hematology consultation at the blood coagulation clinic.    The clotting lab abnormalities were not deemed severe enough to warrant further investigation or treatment with any type of medication.    MRI examination from today demonstrates no evidence of osteonecrosis involving his distal femur or proximal tibia of either knee joint.    Patient notes he is having additional and increased pain in his hips.    Impression and plan:  We again discussed his candidacy for the MOS T and BAT ON trials for osteonecrosis.  He is a candidate for both trials.  However, given the increased hip pain he is experiencing I asked him to repeat hip films to ensure that a subchondral fracture is not developed since the last x-rays of November 2023.  Provided there is been no change, then he remains a candidate for both trials.  I will have our study  coordinator Cate and Jyoti contact the patient to explain the enrollment process.    We also discussed the nature of the core decompression surgery with bone marrow derived concentrate grafting procedure.  We discussed the expected outcomes along with risk benefits alternatives.  Patient is interested in proceeding with this procedure as long as he remains eligible.    Kenyon Ayala MD [facility] Professor  Oncology and Adult Reconstructive Surgery  Dept Orthopaedic Surgery, [facility]  [phone] office, [phone] pager  [email or web address]      Virtual-Visit Details    Type of service:  Video Visit  Visit total duration (including visit time, pre and post visit work time as documented above on the same day of service): 20  min  Video start time: 1605  Video end time:  1620  Non video work time 10  Originating Location (pt. Location): Home  Distant Location (provider location):  [facility]   Platform used for Virtual Visit: [employer]

## 2024-04-01 NOTE — NURSING NOTE
Is the patient currently in the state of [place]? YES    Visit mode:VIDEO    If the visit is dropped, the patient can be reconnected by: VIDEO VISIT: Text to cell phone:   Telephone Information:   Mobile [phone]       Will anyone else be joining the visit? NO  (If patient encounters technical issues they should call [phone])    How would you like to obtain your AVS? Mail a copy    Are changes needed to the allergy or medication list? No    Reason for visit: RECHECK    Medications and allergies have been reviewed.      Len FISHER

## 2024-04-02 ENCOUNTER — ANCILLARY PROCEDURE (OUTPATIENT)
Dept: GENERAL RADIOLOGY | Facility: CLINIC | Age: 44
End: 2024-04-02
Attending: ORTHOPAEDIC SURGERY
Payer: MEDICAID

## 2024-04-02 DIAGNOSIS — M25.551 BILATERAL HIP PAIN: ICD-10-CM

## 2024-04-02 DIAGNOSIS — M25.552 BILATERAL HIP PAIN: ICD-10-CM

## 2024-04-02 PROCEDURE — 73522 X-RAY EXAM HIPS BI 3-4 VIEWS: CPT | Performed by: RADIOLOGY

## 2024-07-19 ENCOUNTER — TELEPHONE (OUTPATIENT)
Dept: ORTHOPEDICS | Facility: CLINIC | Age: 44
End: 2024-07-19
Payer: MEDICAID

## 2024-07-19 NOTE — TELEPHONE ENCOUNTER
Other: pt called would like to schedule hip surgery.     Could we send this information to you in [employer] or would you prefer to receive a phone call?:   Patient would prefer a phone call   Okay to leave a detailed message?: Yes at Home number on file [phone] (home)

## 2024-07-23 ENCOUNTER — TELEPHONE (OUTPATIENT)
Dept: ORTHOPEDICS | Facility: CLINIC | Age: 44
End: 2024-07-23
Payer: MEDICAID

## 2024-07-23 ENCOUNTER — PREP FOR PROCEDURE (OUTPATIENT)
Dept: ORTHOPEDICS | Facility: CLINIC | Age: 44
End: 2024-07-23
Payer: MEDICAID

## 2024-07-23 DIAGNOSIS — M87.9 OSTEONECROSIS (H): Primary | ICD-10-CM

## 2024-07-23 RX ORDER — CEFAZOLIN SODIUM 2 G/50ML
2 SOLUTION INTRAVENOUS SEE ADMIN INSTRUCTIONS
Status: CANCELLED | OUTPATIENT
Start: 2024-07-23

## 2024-07-23 RX ORDER — CEFAZOLIN SODIUM 2 G/50ML
2 SOLUTION INTRAVENOUS
Status: CANCELLED | OUTPATIENT
Start: 2024-07-23

## 2024-07-23 NOTE — TELEPHONE ENCOUNTER
- A call was placed to the patient.     - The right side is the one that is having pain, he is not having pain on the left.      - Patient verbalized understanding of plan and all questions were answered. Call back number to clinic was given and patient was told to call if they had an further questions.

## 2024-07-23 NOTE — TELEPHONE ENCOUNTER
A call was placed to the patient and pre-op teaching was performed over the phone.    Teaching Flowsheet   Relevant Diagnosis: Pre-Op Teaching  Teaching Topic:      Person(s) involved in teaching:   Patient     Motivation Level:  Asks Questions: Yes  Eager to Learn: Yes  Cooperative: Yes  Receptive (willing/able to accept information): Yes  Any cultural factors/Temple beliefs that may influence understanding or compliance? No     Patient demonstrates understanding of the following:  Reason for the appointment, diagnosis and treatment plan: Yes  Knowledge of proper use of medications and conditions for which they are ordered (with special attention to potential side effects or drug interactions): Yes  Which situations necessitate calling provider and whom to contact: Yes- discussed the stoplight tool to help assist with this.      Teaching Concerns Addressed:      Proper use of surgical scrub explain: Yes    Nutritional needs and diet plan: Yes  Pain management techniques: Yes  Wound Care: Yes  How and/when to access community resources: Yes     Instructional Materials Used/Given:  a bottle of chlorhexidine soap and a surgery packet given to patient in clinic. Instructed patient to buy or get 8 ounces of antiseptic surgical soap called 4% CHG. Common name brand of this soap are Hibiclens and Exidine. I told them they can find this at their local pharmacy, clinic or retail store. If they have trouble finding it, I told them to ask their pharmacist to help them find a substitute.      - Important contact info/ phone numbers: emphasizing clinic number and after hours number  - Map/ location of surgery  - Medications to avoid  - Showering instructions  - Stop light tool    Additionally the following was discussed with patient:  - Emily will be driving the patient to surgery and staying with them for 24 hours.       -Next step: schedule a Pre-Op appointment with PAC and no surgery date yet    Time spent with patient:  15 minutes.

## 2024-07-30 NOTE — TELEPHONE ENCOUNTER
Patient is scheduled for surgery with Dr. Ayala    Spoke with: patient    Date of Surgery: 8/30/24    Location: [place]    Post op: 9/13/24    Pre op with Provider complete    H&P: Scheduled with PAC 8/13/24    Additional imaging/appointments: N/A    Surgery packet: received     Additional comments: on cx list for earlier surgery date        Barbra Burnham CMA on 7/30/2024 at 4:12 PM

## 2024-07-31 NOTE — TELEPHONE ENCOUNTER
FUTURE VISIT INFORMATION      SURGERY INFORMATION:  Date: 24  Location: [facility]  Surgeon:  Kenyon Ayala MD   Anesthesia Type:  choice  Procedure: Core Decompression of Femoral Heads with Bone Marrow Derived Concentrate Grafting   Consult: virtual visit 24    RECORDS REQUESTED FROM:       Primary Care Provider: [facility]    Pertinent Medical History: MARY ANN    Most recent EKG+ Tracin21- [facility]    Most recent Cardiac Stress Test: 21- [facility]

## 2024-07-31 NOTE — NURSING NOTE
A call was placed to the patient and pre-op teaching was performed over the phone.    Teaching Flowsheet   Relevant Diagnosis: Pre-Op Teaching  Teaching Topic:      Person(s) involved in teaching:   Patient     Motivation Level:  Asks Questions: Yes  Eager to Learn: Yes  Cooperative: Yes  Receptive (willing/able to accept information): Yes  Any cultural factors/Evangelical beliefs that may influence understanding or compliance? No     Patient demonstrates understanding of the following:  Reason for the appointment, diagnosis and treatment plan: Yes  Knowledge of proper use of medications and conditions for which they are ordered (with special attention to potential side effects or drug interactions): Yes  Which situations necessitate calling provider and whom to contact: Yes- discussed the stoplight tool to help assist with this.      Teaching Concerns Addressed:      Proper use of surgical scrub explain: Yes    Nutritional needs and diet plan: Yes  Pain management techniques: Yes  Wound Care: Yes  How and/when to access community resources: Yes  Need for pre-op with in 30 days: Yes  -I asked them to ensure they go over their daily medications during this visit and discuss what medications need to be stopped before surgery and when. If you are doing a pre-op with your PCP and they are not within the [facility], I ask them to fax it to our pre-op office. Patient verbalized understanding.      Instructional Materials Used/Given:  a surgery packet sent in the mail. Instructed patient to buy or get 8 ounces of antiseptic surgical soap called 4% CHG. Common name brand of this soap are Hibiclens and Exidine. I told them they can find this at their local pharmacy, clinic or retail store. If they have trouble finding it, I told them to ask their pharmacist to help them find a substitute.      - Important contact info/ phone numbers: emphasizing clinic number and after hours number  - Map/ location of surgery  - Showering  instructions  - Stop light tool    Additionally the following was discussed with patient:  Patient verbalized understanding they need an adults drive and to have someone stay with them for 24 hours after surgery.       -Next step: Surgery date: 8/30 and PAC appointment scheduled for 8/13    Time spent with patient: 15 minutes.

## 2024-08-13 ENCOUNTER — PRE VISIT (OUTPATIENT)
Dept: SURGERY | Facility: CLINIC | Age: 44
End: 2024-08-13

## 2024-08-13 ENCOUNTER — ANESTHESIA EVENT (OUTPATIENT)
Dept: SURGERY | Facility: CLINIC | Age: 44
End: 2024-08-13
Payer: COMMERCIAL

## 2024-08-13 ENCOUNTER — OFFICE VISIT (OUTPATIENT)
Dept: SURGERY | Facility: CLINIC | Age: 44
End: 2024-08-13
Payer: COMMERCIAL

## 2024-08-13 ENCOUNTER — LAB (OUTPATIENT)
Dept: LAB | Facility: CLINIC | Age: 44
End: 2024-08-13
Payer: COMMERCIAL

## 2024-08-13 VITALS
TEMPERATURE: 98.4 F | OXYGEN SATURATION: 96 % | HEIGHT: 70 IN | SYSTOLIC BLOOD PRESSURE: 113 MMHG | BODY MASS INDEX: 27.35 KG/M2 | HEART RATE: 84 BPM | WEIGHT: 191 LBS | DIASTOLIC BLOOD PRESSURE: 77 MMHG

## 2024-08-13 DIAGNOSIS — Z01.818 PREOP EXAMINATION: Primary | ICD-10-CM

## 2024-08-13 DIAGNOSIS — M87.9 OSTEONECROSIS (H): ICD-10-CM

## 2024-08-13 DIAGNOSIS — Z01.818 PREOP EXAMINATION: ICD-10-CM

## 2024-08-13 LAB
ANION GAP SERPL CALCULATED.3IONS-SCNC: 9 MMOL/L (ref 7–15)
BUN SERPL-MCNC: 10 MG/DL (ref 6–20)
CALCIUM SERPL-MCNC: 9.6 MG/DL (ref 8.8–10.4)
CHLORIDE SERPL-SCNC: 103 MMOL/L (ref 98–107)
CREAT SERPL-MCNC: 1.18 MG/DL (ref 0.67–1.17)
EGFRCR SERPLBLD CKD-EPI 2021: 80 ML/MIN/1.73M2
ERYTHROCYTE [DISTWIDTH] IN BLOOD BY AUTOMATED COUNT: 12.5 % (ref 10–15)
GLUCOSE SERPL-MCNC: 107 MG/DL (ref 70–99)
HCO3 SERPL-SCNC: 28 MMOL/L (ref 22–29)
HCT VFR BLD AUTO: 38.5 % (ref 40–53)
HGB BLD-MCNC: 13 G/DL (ref 13.3–17.7)
MCH RBC QN AUTO: 30.7 PG (ref 26.5–33)
MCHC RBC AUTO-ENTMCNC: 33.8 G/DL (ref 31.5–36.5)
MCV RBC AUTO: 91 FL (ref 78–100)
PLATELET # BLD AUTO: 194 10E3/UL (ref 150–450)
POTASSIUM SERPL-SCNC: 3.9 MMOL/L (ref 3.4–5.3)
RBC # BLD AUTO: 4.24 10E6/UL (ref 4.4–5.9)
SODIUM SERPL-SCNC: 140 MMOL/L (ref 135–145)
WBC # BLD AUTO: 15.1 10E3/UL (ref 4–11)

## 2024-08-13 PROCEDURE — 36415 COLL VENOUS BLD VENIPUNCTURE: CPT | Performed by: PATHOLOGY

## 2024-08-13 PROCEDURE — 85027 COMPLETE CBC AUTOMATED: CPT | Performed by: PATHOLOGY

## 2024-08-13 PROCEDURE — 80048 BASIC METABOLIC PNL TOTAL CA: CPT | Performed by: PATHOLOGY

## 2024-08-13 PROCEDURE — 99203 OFFICE O/P NEW LOW 30 MIN: CPT | Performed by: PHYSICIAN ASSISTANT

## 2024-08-13 RX ORDER — CHOLECALCIFEROL (VITAMIN D3) 50 MCG
2000 TABLET ORAL DAILY
COMMUNITY
Start: 2024-07-03

## 2024-08-13 RX ORDER — ASCORBIC ACID 500 MG
500 TABLET ORAL DAILY
COMMUNITY

## 2024-08-13 RX ORDER — ACETAMINOPHEN 325 MG/1
325-650 TABLET ORAL EVERY 6 HOURS PRN
Status: ON HOLD | COMMUNITY
End: 2024-08-30

## 2024-08-13 RX ORDER — VITAMIN E 268 MG
CAPSULE ORAL DAILY
COMMUNITY

## 2024-08-13 RX ORDER — HYDROCODONE BITARTRATE AND ACETAMINOPHEN 5; 325 MG/1; MG/1
1-2 TABLET ORAL EVERY 4 HOURS PRN
COMMUNITY
Start: 2024-07-31 | End: 2024-08-28

## 2024-08-13 ASSESSMENT — PAIN SCALES - GENERAL: PAINLEVEL: WORST PAIN (10)

## 2024-08-13 ASSESSMENT — LIFESTYLE VARIABLES: TOBACCO_USE: 0

## 2024-08-13 ASSESSMENT — ENCOUNTER SYMPTOMS: SEIZURES: 0

## 2024-08-13 NOTE — PATIENT INSTRUCTIONS
Preparing for Your Surgery      Name:  Neil Pompa   MRN:  [id number]   :  1980   Today's Date:  2024       Arriving for surgery:  Surgery date:  2024  Arrival time:  5:30 AM    Please come to:     Please come to:       [facility] [address]   [address] [place]  [address]     The Green Ramp for patients and visitors is beneath the [facility]. The parking facility entrance is at the intersection of [address] [place] [address] and [address]. Patients and visitors who self-park will receive the reduced hospital parking rate (no ticket validation needed).     tu.nr parking, located at the [facility] main entrance on [address] [place] [address], is available Monday - Friday from 7 am to 3:30 pm.     Discounted parking pass options can be purchased from  attendants during business hours.     -Check in at the security desk in the [facility] ([facility])   Lobby. They will direct you to the correct elevators.   -Proceed to the 3rd floor, Adult Surgery Waiting Lounge. [phone]     If you need directions, a wheelchair or escort please stop at the Information Desk in the lobby.  Inform the information person you are here for surgery; a wheelchair and escort to [address] will be provided.   An escort to the Adult Surgery Waiting Lounge will be provided. .    What can I eat or drink?  -  You may eat and drink normally up to 8 hours prior to arrival time. (Until 9:30 PM )  -  You may have clear liquids until 2 hours prior to arrival time. (Until 3:30 AM)    Examples of clear liquids:  Water  Clear broth  Juices (apple, white grape, white cranberry  and cider) without pulp  Noncarbonated, powder based beverages  (lemonade and Jermaine-Aid)  Sodas (Sprite, 7-Up, ginger ale and seltzer)  Coffee or tea (without milk or cream)  Gatorade    -  No Alcohol or cannabis products  for at least 24 hours before surgery.     Which medicines can I take?    Hold Aspirin for 7 days before surgery.   Hold Multivitamins for 7 days before surgery.  **Hold Supplements for 7 days before surgery. (Vitamin C, D3, E)  Hold Ibuprofen (Advil, Motrin) for 3 day(s) before surgery--unless otherwise directed by surgeon.  Hold Naproxen (Aleve) for 4 days before surgery.  **Hold Celebrex for 3 days before surgery.    -  PLEASE TAKE these medications the day of surgery:  Norco if needed  Tylenol if needed  Genvoya  Nasonex if needed    How do I prepare myself?  - Please take 2 showers (one the night prior to surgery and one the morning of surgery) using Scrubcare or Hibiclens soap.    Use this soap only from the neck to your toes.     Leave the soap on your skin for one minute--then rinse thoroughly.      You may use your own shampoo and conditioner. No other hair products.   - Please remove all jewelry and body piercings.  - No lotions, deodorants or fragrance.  - No makeup or fingernail polish.   - Bring your ID and insurance card.    -If you use a CPAP machine, please bring the CPAP machine, tubing, and mask to hospital.    -If you have a Deep Brain Stimulator, Spinal Cord Stimulator, or any Neuro Stimulator device---you must bring the remote control to the hospital.      ALL PATIENTS GOING HOME THE SAME DAY OF SURGERY ARE REQUIRED TO HAVE A RESPONSIBLE ADULT TO DRIVE AND BE IN ATTENDANCE WITH THEM FOR 24 HOURS FOLLOWING SURGERY.    Covid testing policy as of 12/06/2022  Your surgeon will notify and schedule you for a COVID test if one is needed before surgery--please direct any questions or COVID symptoms to your surgeon      Questions or Concerns:    - For any questions regarding the day of surgery or your hospital stay, please contact the Pre Admission Nursing Office at [phone].       - If you have health changes between today and your surgery, please call your surgeon.       - For questions after  surgery, please call your surgeons office.           Current Visitor Guidelines    You may have 2 visitors in the pre op area.    Visiting hours: 8 a.m. to 8:30 p.m.    Patients confirmed or suspected to have symptoms of COVID 19 or flu:     No visitors allowed for adult patients.   Children (under age 18) can have 1 named visitor.     People who are sick or showing symptoms of COVID 19 or flu:    Are not allowed to visit patients--we can only make exceptions in special situations.       Please follow these guidelines for your visit:          Please maintain social distance          Masking is optional--however at times you may be asked to wear a mask for the safety of yourself and others     Clean your hands with alcohol hand . Do this when you arrive at and leave the building and patient room,    And again after you touch your mask or anything in the room.     Go directly to and from the room you are visiting.     Stay in the patient s room during your visit. Limit going to other places in the hospital as much as possible     Leave bags and jackets at home or in the car.     For everyone s health, please don t come and go during your visit. That includes for smoking   during your visit.

## 2024-08-13 NOTE — H&P
Pre-Operative H & P     CC:  Preoperative exam to assess for increased cardiopulmonary risk while undergoing surgery and anesthesia.    Date of Encounter: 8/13/2024  Primary Care Physician:  Jese Dwyer     Reason for visit:   Encounter Diagnoses   Name Primary?    Osteonecrosis (H) Yes    Preop examination        HPI  Neil Pompa is a 43 year old male who presents for pre-operative H & P in preparation for  Procedure Information       Case: [id number] Date/Time: 08/30/24 0730    Procedure: Core Decompression of Femoral Heads with Bone Marrow Derived Concentrate Grafting (Bilateral: Hip)    Anesthesia type: Choice    Diagnosis: Osteonecrosis (H) [M87.9]    Pre-op diagnosis: Osteonecrosis (H) [M87.9]    Location:  OR  /  OR    Providers: Kenyon Ayala MD            Patient is being evaluated for comorbid conditions of MARY ANN, chronic rhinitis, HIV, depression, PTSD, keloid formation.     Mr. Pompa has a history of Osteonecrosis of the femoral head, bilaterally, Arco stage II, greater than 30% of femoral head involvement bilaterally. Possibly related to HIV medication (protease inhibitors). He was counseled by Dr. Ayala and now presents for the above procedure.      History is obtained from the patient and chart review    Hx of abnormal bleeding or anti-platelet use: denies      Past Medical History  Past Medical History:   Diagnosis Date    Anti-cardiolipin antibody positive     Depression     Human immunodeficiency virus (HIV) disease (H)     Osteonecrosis (H)     Tonsillar hypertrophy        Past Surgical History  Past Surgical History:   Procedure Laterality Date    CERVICAL DISCECTOMY  2014    DENTAL SURGERY         Prior to Admission Medications  Current Outpatient Medications   Medication Sig Dispense Refill    acetaminophen (TYLENOL) 325 MG tablet Take 325-650 mg by mouth every 6 hours as needed for mild pain      celecoxib (CELEBREX) 200 MG capsule Take 200 mg by mouth 2 times daily      GENVOYA  "[phone] MG PO TABS Take 1 tablet by mouth every morning      HYDROcodone-acetaminophen (NORCO) 5-325 MG tablet Take 1-2 tablets by mouth every 4 hours as needed      mometasone (NASONEX) 50 MCG/ACT nasal spray Spray 2 sprays into both nostrils as needed      vitamin C (ASCORBIC ACID) 500 MG tablet Take 500 mg by mouth daily      VITAMIN D3 50 MCG (2000 UT) tablet Take 2,000 Units by mouth daily      Vitamin E 180 MG (400 UNIT) CAPS Take by mouth daily         Allergies  No Known Allergies    Social History  Social History     Socioeconomic History    Marital status: Single     Spouse name: Not on file    Number of children: Not on file    Years of education: Not on file    Highest education level: Not on file   Occupational History    Not on file   Tobacco Use    Smoking status: Never     Passive exposure: Current    Smokeless tobacco: Never   Substance and Sexual Activity    Alcohol use: Not Currently     Comment: 4 drinks when travels    Drug use: Never    Sexual activity: Not on file   Other Topics Concern    Not on file   Social History Narrative    Not on file     Social Determinants of Health     Financial Resource Strain: Not on file   Food Insecurity: Not on file   Transportation Needs: Not on file   Physical Activity: Not on file   Stress: Not on file   Social Connections: Not on file   Interpersonal Safety: Not on file   Housing Stability: Not on file       Family History  Family History   Problem Relation Age of Onset    Anesthesia Reaction No family hx of     Deep Vein Thrombosis (DVT) No family hx of        Review of Systems  The complete review of systems is negative other than noted in the HPI or here.   Anesthesia Evaluation   Pt has had prior anesthetic.     No history of anesthetic complications       ROS/MED HX  ENT/Pulmonary: Comment: Chronic rhinitis      (+) sleep apnea (Does not want CPAP bc it's \"bulky\" and he \"tosses and turns.\"  Has 3+ tonsils.), doesn't use CPAP,                     " "              (-) tobacco use, asthma and recent URI   Neurologic:  - neg neurologic ROS  (-) no seizures and no CVA   Cardiovascular:     (+)  - -   -  - -                                 Previous cardiac testing   Echo: Date: Results:    Stress Test:  Date: 2021 Results:   * STRESS ECHO.     * Stress Echo is negative for inducible wall motion abnormalities.     * Stress EKG is negative for ischemic changes.     * No chest pain noted.     * 15 MET and 100 % max predicted heart rate (MPHR) achieved.     * Excellent aerobic capacity.     * RESTING ECHO.     * The left ventricular systolic function is normal, estimated LVEF 60-65%.       ECG Reviewed:  Date: Results:    Cath:  Date: Results:      METS/Exercise Tolerance: 3 - Able to walk 1-2 blocks without stopping Comment: Activity significantly limited due to hip pain.   Hematologic:  - neg hematologic  ROS  (-) history of blood clots and history of blood transfusion   Musculoskeletal: Comment: Osteonecrosis of the femoral head, bilaterally      GI/Hepatic:  - neg GI/hepatic ROS  (-) GERD and liver disease   Renal/Genitourinary:  - neg Renal ROS  (-) renal disease   Endo:    (-) Type II DM   Psychiatric/Substance Use: Comment: PTSD      (+) psychiatric history depression       Infectious Disease:     (+)   MRSA (remote hx),  HIV,       Malignancy:    (-) malignancy   Other: Comment: Keloids             /77 (BP Location: Right arm, Patient Position: Sitting, Cuff Size: Adult Large)   Pulse 84   Temp 98.4  F (36.9  C) (Oral)   Ht 1.778 m (5' 10\")   Wt 86.6 kg (191 lb)   SpO2 96%   BMI 27.41 kg/m      Physical Exam  Constitutional: Awake, alert, cooperative, no apparent distress, and appears stated age.  Eyes: Pupils equal, round and reactive to light, extra ocular muscles intact, sclera clear, conjunctiva normal.  HENT: Normocephalic, oral pharynx with moist mucus membranes, good dentition. No goiter appreciated. No removable dental hardware.  Respiratory: " Clear to auscultation bilaterally, no crackles or wheezing. No SOB when supine.  Cardiovascular: Regular rate and rhythm, normal S1 and S2, and no murmur noted.  Carotids +2, no bruits. No edema. Palpable pulses to radial, DP and PT arteries.   GI: Normal bowel sounds, soft, non-distended, non-tender, no masses palpated.    Lymph/Hematologic: No cervical lymphadenopathy and no supraclavicular lymphadenopathy.  Genitourinary:  deferred  Skin: Warm and dry.  No rashes.   Musculoskeletal: full ROM of neck. There is no redness, warmth, or swelling of the joints. Gross motor strength is mildly diminished in RLE.    Neurologic: Awake, alert, oriented to name, place and time. Cranial nerves II-XII are grossly intact. Gait is normal. Ambulates from chair to exam table, seats self, lies supine and sits back up w/o assistance.  Neuropsychiatric: Calm, cooperative. Normal affect. Pleasant. Answers questions appropriately, follows commands w/o difficulty.        PRIOR LABS/DIAGNOSTIC STUDIES:    All labs and imaging personally reviewed        Stress test -  please see in ROS above         The patient's records and results personally reviewed by this provider.     Outside records reviewed from: Care Everywhere    LAB/DIAGNOSTIC STUDIES TODAY:  BMP, CBC    Assessment    Neil Pompa is a 43 year old male seen as a PAC referral for risk assessment and optimization for anesthesia.    Plan/Recommendations  Pt will be optimized for the proposed procedure.  See below for details on the assessment, risk, and preoperative recommendations    NEUROLOGY  - No history of TIA, CVA or seizure    -Post Op delirium risk factors:  No risk identified    ENT  - No current airway concerns.  Will need to be reassessed day of surgery.  Mallampati: II  TM: > 3  - Tonsilar hypertrophy    CARDIAC  - No history of CAD, Hypertension, and Afib  - Stress test 2021:  No ischemia    - METS (Metabolic Equivalents)  Activity significantly limited due to hip  "pain.  Patient CANNOT perform 4 METS exercise without symptoms             Total Score: 1    Functional Capacity: Unable to complete 4 METS      RCRI-Very low risk: Class 1 0.4% complication rate             Total Score: 0        PULMONARY  - MARY ANN, untreated. Does not tolerate CPAP.     - Denies asthma or inhaler use  - Tobacco History    History   Smoking Status    Never   Smokeless Tobacco    Never       GI  - Denies GERD  PONV Medium Risk  Total Score: 2           1 AN PONV: Patient is not a current smoker    1 AN PONV: Intended Post Op Opioids          ENDOCRINE    - BMI: Estimated body mass index is 27.41 kg/m  as calculated from the following:    Height as of this encounter: 1.778 m (5' 10\").    Weight as of this encounter: 86.6 kg (191 lb).  Healthy Weight (BMI 18.5-24.9)  - No history of Diabetes Mellitus    HEME/ IMMUNE  VTE Low Risk 0.5%             Total Score: 2    VTE: Male      - No history of abnormal bleeding or antiplatelet use.  - HIV, continue Genvoya    - Positive Cardiolipin IgG and IgM antibodies, which can be part of the diagnostic criteria for antiphospholipid antibody syndrome. Seen by hematology 3/25/24. Per note review, \" there is a more likely explanation that causes his osteonecrosis, which is his HIV medication. Thus based on this newest criteria, there are no indications to perform APS testing as he does not meet any of the clinical criteria.\"  Also, \" He is cleared from a hematological standpoint to proceed with hip surgery. \"    MSK  Patient IS Frail             Total Score: 3    Frailty: Weight loss 10 lbs or greater    Frailty: Slower walking speed    Frailty: Increased exhaustion            The patient is aware that the final anesthesia plan will be decided by the assigned anesthesia provider on the date of service.      The patient is optimized for their procedure. AVS with information on surgery time/arrival time, meds and NPO status given by nursing staff. No further diagnostic " testing indicated.      On the day of service:     Prep time: 12 minutes  Visit time: 18 minutes  Documentation time: 10 minutes  ------------------------------------------  Total time: 40 minutes      Evelyn De La Paz PA-C  Preoperative Assessment Center  [facility]  Cl[facility] and [facility]  Phone: [phone]  Fax: [phone]

## 2024-08-14 ENCOUNTER — TELEPHONE (OUTPATIENT)
Dept: ORTHOPEDICS | Facility: CLINIC | Age: 44
End: 2024-08-14
Payer: COMMERCIAL

## 2024-08-14 NOTE — TELEPHONE ENCOUNTER
- A call was placed to the patient.     - I let him know that he will be weight bearing as tolerated after surgyer. We want him to avoid high impact activities such as running and jumping at least until his post-op.    - I told him we would like some to stay with him for 24 hours after surgery but after that if he is feeling okay, he will be okay to be alone.     - He wanted to confirm both hip would be done. I let him know per case request that the procedure is for bilateral decompression. He was glad to hear this.     - Patient verbalized understanding of plan and all questions were answered. Call back number to clinic was given and patient was told to call if they had an further questions.

## 2024-08-14 NOTE — TELEPHONE ENCOUNTER
----- Message from Kenyon Ayala sent at 8/13/2024 10:56 PM CDT -----  Regarding: RE: surgery questions  Thanks Evelyn.  Nazia, I anticipate you'll be answer all his queries but let me know if pt needs a clinic visit with me after you call him.    Thank you.;  ----- Message -----  From: Evelyn De La Paz PA-C  Sent: 8/13/2024   3:25 PM CDT  To: Kenyon Ayala MD; Nazia Hernandes RN  Subject: surgery questions                                Hello,    This patient was seen in PAC today for H&P for Core Decompression of Femoral Heads with Bone Marrow Derived Concentrate Grafting scheduled on 8/30. He has several questions regarding his expected postop recovery period, as he lives alone. Can someone from your team reach out to him to discuss?    ThanksEvelyn

## 2024-08-16 ENCOUNTER — ANCILLARY PROCEDURE (OUTPATIENT)
Facility: CLINIC | Age: 44
End: 2024-08-16
Attending: ORTHOPAEDIC SURGERY
Payer: COMMERCIAL

## 2024-08-16 DIAGNOSIS — Z00.6 RESEARCH EXAM: ICD-10-CM

## 2024-08-21 NOTE — PROGRESS NOTES
SURGERY PLAN (PRE-OP PLAN)     Patient Position (indicated by x):  X  Supine   x   Garrett x-ray table        x  Split drape with top bar (blue towel between legs with ioban, prep bilaterally simultaneously)   x   Blue U drape x2   Blue and white stockinet   Coban   Ioban      General Equipment Requests (indicated by x):    x  C-Arm      No need for C-Armor drape   x  Synthes cannulated 5.0 mm screw set (drill tipped guide wire)   x  [place] BMAC kit x 2   x  long bone marrow (green handle) bone marrow biopsy cannula    x Guide wire (Dayday) subchondral cannula      Specimens and cultures (indicated by x):   x  will send 2 cc of each aspirate for CFU and cell counts      Frozen section      pathology specimens - fresh      pathology specimens - formalin     DX:  Bilateral hip AVN    Plan:  Bilateral Femoral Head Core Decompression    Gibson Ware MD  Orthopaedic Surgery, Adult Reconstruction Fellow

## 2024-08-30 ENCOUNTER — ANESTHESIA (OUTPATIENT)
Dept: SURGERY | Facility: CLINIC | Age: 44
End: 2024-08-30
Payer: COMMERCIAL

## 2024-08-30 ENCOUNTER — APPOINTMENT (OUTPATIENT)
Dept: GENERAL RADIOLOGY | Facility: CLINIC | Age: 44
End: 2024-08-30
Attending: ORTHOPAEDIC SURGERY
Payer: COMMERCIAL

## 2024-08-30 ENCOUNTER — HOSPITAL ENCOUNTER (OUTPATIENT)
Facility: CLINIC | Age: 44
Discharge: HOME | End: 2024-08-30
Attending: ORTHOPAEDIC SURGERY | Admitting: ORTHOPAEDIC SURGERY
Payer: COMMERCIAL

## 2024-08-30 VITALS
SYSTOLIC BLOOD PRESSURE: 118 MMHG | DIASTOLIC BLOOD PRESSURE: 81 MMHG | WEIGHT: 190.26 LBS | OXYGEN SATURATION: 95 % | HEART RATE: 79 BPM | BODY MASS INDEX: 27.24 KG/M2 | HEIGHT: 70 IN | RESPIRATION RATE: 20 BRPM | TEMPERATURE: 97.5 F

## 2024-08-30 DIAGNOSIS — M87.9 OSTEONECROSIS (H): ICD-10-CM

## 2024-08-30 LAB
BILL ONLY STEM CELL CFU GEMM: NORMAL
CD133/2+ % OF VIABLE CD45 POSITIVE CELLS: 0.43 %
CD133/2+ % OF VIABLE CD45 POSITIVE CELLS: 0.51 %
CD34 TOTAL, % OF VIABLE CD45 POSITIVE CELLS: 1.04 %
CD34 TOTAL, % OF VIABLE CD45 POSITIVE CELLS: 1.14 %
CD34+/CD133+ % OF VIABLE CD45 POSITIVE CELLS: 0.43 %
CD34+/CD133+ % OF VIABLE CD45 POSITIVE CELLS: 0.51 %
CD34+/CD133- % OF VIABLE CD45 POSITIVE CELLS: 0.7 %
CD34+/CD133- % OF VIABLE CD45 POSITIVE CELLS: 0.72 %
CD34-/CD133+ % OF VIABLE CD45 POSITIVE CELLS: 0 %
CD34-/CD133+ % OF VIABLE CD45 POSITIVE CELLS: 0 %
CD45+ % OF VIABLE CELLS: 92.31 %
CD45+ % OF VIABLE CELLS: 98.07 %
HBV SURFACE AG SERPL QL IA: NONREACTIVE
HIV 1+2 AB+HIV1 P24 AG SERPL QL IA: REACTIVE
HIV 1+2 AB+HIV1P24 AG SERPLBLD IA.RAPID: NON REACTIVE
HIV 1+2 AB+HIV1P24 AG SERPLBLD IA.RAPID: REACTIVE
HIV INTERPRETATION: ABNORMAL
PRODUCT NUMBER FLOW CYTOMETRY: NORMAL
PRODUCT NUMBER FLOW CYTOMETRY: NORMAL
VIABILITY, % OF TOTAL CELLS: 93.66 %
VIABILITY, % OF TOTAL CELLS: 95.09 %
WBC MAR: 21.9 10E3/UL
WBC MAR: 97.8 10E3/UL

## 2024-08-30 PROCEDURE — 272N000002 HC OR SUPPLY OTHER OPNP: Performed by: ORTHOPAEDIC SURGERY

## 2024-08-30 PROCEDURE — C1769 GUIDE WIRE: HCPCS | Performed by: ORTHOPAEDIC SURGERY

## 2024-08-30 PROCEDURE — 88184 FLOWCYTOMETRY/ TC 1 MARKER: CPT | Mod: 59 | Performed by: ORTHOPAEDIC SURGERY

## 2024-08-30 PROCEDURE — 38232 BONE MARROW HARVEST AUTOLOG: CPT | Performed by: NURSE ANESTHETIST, CERTIFIED REGISTERED

## 2024-08-30 PROCEDURE — 250N000009 HC RX 250: Performed by: NURSE ANESTHETIST, CERTIFIED REGISTERED

## 2024-08-30 PROCEDURE — 710N000012 HC RECOVERY PHASE 2, PER MINUTE: Performed by: ORTHOPAEDIC SURGERY

## 2024-08-30 PROCEDURE — 999N000141 HC STATISTIC PRE-PROCEDURE NURSING ASSESSMENT: Performed by: ORTHOPAEDIC SURGERY

## 2024-08-30 PROCEDURE — 250N000011 HC RX IP 250 OP 636: Performed by: ANESTHESIOLOGY

## 2024-08-30 PROCEDURE — 999N000104 HC STATISTIC NO CHARGE: Performed by: INTERNAL MEDICINE

## 2024-08-30 PROCEDURE — 250N000013 HC RX MED GY IP 250 OP 250 PS 637: Performed by: ANESTHESIOLOGY

## 2024-08-30 PROCEDURE — 272N000001 HC OR GENERAL SUPPLY STERILE: Performed by: ORTHOPAEDIC SURGERY

## 2024-08-30 PROCEDURE — 250N000011 HC RX IP 250 OP 636: Performed by: ORTHOPAEDIC SURGERY

## 2024-08-30 PROCEDURE — 370N000017 HC ANESTHESIA TECHNICAL FEE, PER MIN: Performed by: ORTHOPAEDIC SURGERY

## 2024-08-30 PROCEDURE — 250N000011 HC RX IP 250 OP 636: Performed by: PHYSICIAN ASSISTANT

## 2024-08-30 PROCEDURE — 250N000025 HC SEVOFLURANE, PER MIN: Performed by: ORTHOPAEDIC SURGERY

## 2024-08-30 PROCEDURE — 38232 BONE MARROW HARVEST AUTOLOG: CPT | Performed by: ANESTHESIOLOGY

## 2024-08-30 PROCEDURE — 258N000003 HC RX IP 258 OP 636: Performed by: PHYSICIAN ASSISTANT

## 2024-08-30 PROCEDURE — 999N000180 XR SURGERY CARM FLUORO LESS THAN 5 MIN: Mod: TC

## 2024-08-30 PROCEDURE — 360N000084 HC SURGERY LEVEL 4 W/ FLUORO, PER MIN: Performed by: ORTHOPAEDIC SURGERY

## 2024-08-30 PROCEDURE — 710N000010 HC RECOVERY PHASE 1, LEVEL 2, PER MIN: Performed by: ORTHOPAEDIC SURGERY

## 2024-08-30 PROCEDURE — 86701 HIV-1ANTIBODY: CPT | Performed by: INTERNAL MEDICINE

## 2024-08-30 PROCEDURE — 250N000013 HC RX MED GY IP 250 OP 250 PS 637: Performed by: PHYSICIAN ASSISTANT

## 2024-08-30 PROCEDURE — 258N000003 HC RX IP 258 OP 636: Performed by: NURSE ANESTHETIST, CERTIFIED REGISTERED

## 2024-08-30 PROCEDURE — 85048 AUTOMATED LEUKOCYTE COUNT: CPT | Performed by: ORTHOPAEDIC SURGERY

## 2024-08-30 PROCEDURE — 250N000011 HC RX IP 250 OP 636: Performed by: NURSE ANESTHETIST, CERTIFIED REGISTERED

## 2024-08-30 PROCEDURE — 86999 UNLISTED TRANSFUSION MED PX: CPT | Performed by: ORTHOPAEDIC SURGERY

## 2024-08-30 PROCEDURE — 87536 HIV-1 QUANT&REVRSE TRNSCRPJ: CPT | Performed by: ORTHOPAEDIC SURGERY

## 2024-08-30 RX ORDER — SODIUM CHLORIDE, SODIUM LACTATE, POTASSIUM CHLORIDE, CALCIUM CHLORIDE 600; 310; 30; 20 MG/100ML; MG/100ML; MG/100ML; MG/100ML
INJECTION, SOLUTION INTRAVENOUS CONTINUOUS
Status: DISCONTINUED | OUTPATIENT
Start: 2024-08-30 | End: 2024-08-30 | Stop reason: HOSPADM

## 2024-08-30 RX ORDER — CEFAZOLIN SODIUM/WATER 2 G/20 ML
2 SYRINGE (ML) INTRAVENOUS SEE ADMIN INSTRUCTIONS
Status: DISCONTINUED | OUTPATIENT
Start: 2024-08-30 | End: 2024-08-30 | Stop reason: HOSPADM

## 2024-08-30 RX ORDER — VANCOMYCIN HYDROCHLORIDE
1500
Status: COMPLETED | OUTPATIENT
Start: 2024-08-30 | End: 2024-08-30

## 2024-08-30 RX ORDER — AMOXICILLIN 250 MG
1-2 CAPSULE ORAL 2 TIMES DAILY
Qty: 30 TABLET | Refills: 0 | Status: SHIPPED | OUTPATIENT
Start: 2024-08-30

## 2024-08-30 RX ORDER — ACETAMINOPHEN 325 MG/1
650 TABLET ORAL
Status: DISCONTINUED | OUTPATIENT
Start: 2024-08-30 | End: 2024-08-30 | Stop reason: HOSPADM

## 2024-08-30 RX ORDER — ONDANSETRON 4 MG/1
4 TABLET, ORALLY DISINTEGRATING ORAL
Status: DISCONTINUED | OUTPATIENT
Start: 2024-08-30 | End: 2024-08-30 | Stop reason: HOSPADM

## 2024-08-30 RX ORDER — OXYCODONE HYDROCHLORIDE 5 MG/1
5-10 TABLET ORAL EVERY 6 HOURS PRN
Qty: 20 TABLET | Refills: 0 | Status: SHIPPED | OUTPATIENT
Start: 2024-08-30

## 2024-08-30 RX ORDER — PROPOFOL 10 MG/ML
INJECTION, EMULSION INTRAVENOUS PRN
Status: DISCONTINUED | OUTPATIENT
Start: 2024-08-30 | End: 2024-08-30

## 2024-08-30 RX ORDER — DEXAMETHASONE SODIUM PHOSPHATE 4 MG/ML
INJECTION, SOLUTION INTRA-ARTICULAR; INTRALESIONAL; INTRAMUSCULAR; INTRAVENOUS; SOFT TISSUE PRN
Status: DISCONTINUED | OUTPATIENT
Start: 2024-08-30 | End: 2024-08-30

## 2024-08-30 RX ORDER — OXYCODONE HYDROCHLORIDE 5 MG/1
5 TABLET ORAL
Status: DISCONTINUED | OUTPATIENT
Start: 2024-08-30 | End: 2024-08-30 | Stop reason: HOSPADM

## 2024-08-30 RX ORDER — NALOXONE HYDROCHLORIDE 0.4 MG/ML
0.1 INJECTION, SOLUTION INTRAMUSCULAR; INTRAVENOUS; SUBCUTANEOUS
Status: DISCONTINUED | OUTPATIENT
Start: 2024-08-30 | End: 2024-08-30 | Stop reason: HOSPADM

## 2024-08-30 RX ORDER — HYDROMORPHONE HYDROCHLORIDE 1 MG/ML
0.2 INJECTION, SOLUTION INTRAMUSCULAR; INTRAVENOUS; SUBCUTANEOUS EVERY 5 MIN PRN
Status: DISCONTINUED | OUTPATIENT
Start: 2024-08-30 | End: 2024-08-30 | Stop reason: HOSPADM

## 2024-08-30 RX ORDER — HYDROMORPHONE HYDROCHLORIDE 1 MG/ML
0.4 INJECTION, SOLUTION INTRAMUSCULAR; INTRAVENOUS; SUBCUTANEOUS EVERY 5 MIN PRN
Status: DISCONTINUED | OUTPATIENT
Start: 2024-08-30 | End: 2024-08-30 | Stop reason: HOSPADM

## 2024-08-30 RX ORDER — ACETAMINOPHEN 325 MG/1
650 TABLET ORAL EVERY 4 HOURS PRN
Qty: 50 TABLET | Refills: 0 | Status: SHIPPED | OUTPATIENT
Start: 2024-08-30

## 2024-08-30 RX ORDER — BUPIVACAINE HYDROCHLORIDE 5 MG/ML
INJECTION, SOLUTION PERINEURAL PRN
Status: DISCONTINUED | OUTPATIENT
Start: 2024-08-30 | End: 2024-08-30 | Stop reason: HOSPADM

## 2024-08-30 RX ORDER — DEXAMETHASONE SODIUM PHOSPHATE 4 MG/ML
4 INJECTION, SOLUTION INTRA-ARTICULAR; INTRALESIONAL; INTRAMUSCULAR; INTRAVENOUS; SOFT TISSUE
Status: DISCONTINUED | OUTPATIENT
Start: 2024-08-30 | End: 2024-08-30 | Stop reason: HOSPADM

## 2024-08-30 RX ORDER — LIDOCAINE HYDROCHLORIDE 20 MG/ML
INJECTION, SOLUTION INFILTRATION; PERINEURAL PRN
Status: DISCONTINUED | OUTPATIENT
Start: 2024-08-30 | End: 2024-08-30

## 2024-08-30 RX ORDER — ONDANSETRON 4 MG/1
4 TABLET, ORALLY DISINTEGRATING ORAL EVERY 30 MIN PRN
Status: DISCONTINUED | OUTPATIENT
Start: 2024-08-30 | End: 2024-08-30 | Stop reason: HOSPADM

## 2024-08-30 RX ORDER — SODIUM CHLORIDE, SODIUM LACTATE, POTASSIUM CHLORIDE, CALCIUM CHLORIDE 600; 310; 30; 20 MG/100ML; MG/100ML; MG/100ML; MG/100ML
INJECTION, SOLUTION INTRAVENOUS CONTINUOUS PRN
Status: DISCONTINUED | OUTPATIENT
Start: 2024-08-30 | End: 2024-08-30

## 2024-08-30 RX ORDER — OXYCODONE HYDROCHLORIDE 5 MG/1
10 TABLET ORAL ONCE
Status: COMPLETED | OUTPATIENT
Start: 2024-08-30 | End: 2024-08-30

## 2024-08-30 RX ORDER — ONDANSETRON 2 MG/ML
INJECTION INTRAMUSCULAR; INTRAVENOUS PRN
Status: DISCONTINUED | OUTPATIENT
Start: 2024-08-30 | End: 2024-08-30

## 2024-08-30 RX ORDER — FENTANYL CITRATE 50 UG/ML
50 INJECTION, SOLUTION INTRAMUSCULAR; INTRAVENOUS EVERY 5 MIN PRN
Status: DISCONTINUED | OUTPATIENT
Start: 2024-08-30 | End: 2024-08-30 | Stop reason: HOSPADM

## 2024-08-30 RX ORDER — ONDANSETRON 2 MG/ML
4 INJECTION INTRAMUSCULAR; INTRAVENOUS EVERY 30 MIN PRN
Status: DISCONTINUED | OUTPATIENT
Start: 2024-08-30 | End: 2024-08-30 | Stop reason: HOSPADM

## 2024-08-30 RX ORDER — FENTANYL CITRATE 50 UG/ML
INJECTION, SOLUTION INTRAMUSCULAR; INTRAVENOUS PRN
Status: DISCONTINUED | OUTPATIENT
Start: 2024-08-30 | End: 2024-08-30

## 2024-08-30 RX ORDER — GLYCOPYRROLATE 0.2 MG/ML
INJECTION, SOLUTION INTRAMUSCULAR; INTRAVENOUS PRN
Status: DISCONTINUED | OUTPATIENT
Start: 2024-08-30 | End: 2024-08-30

## 2024-08-30 RX ORDER — METHOCARBAMOL 750 MG/1
750 TABLET, FILM COATED ORAL
Status: DISCONTINUED | OUTPATIENT
Start: 2024-08-30 | End: 2024-08-30 | Stop reason: HOSPADM

## 2024-08-30 RX ORDER — CEFAZOLIN SODIUM/WATER 2 G/20 ML
2 SYRINGE (ML) INTRAVENOUS
Status: DISCONTINUED | OUTPATIENT
Start: 2024-08-30 | End: 2024-08-30 | Stop reason: HOSPADM

## 2024-08-30 RX ORDER — FENTANYL CITRATE 50 UG/ML
25 INJECTION, SOLUTION INTRAMUSCULAR; INTRAVENOUS EVERY 5 MIN PRN
Status: DISCONTINUED | OUTPATIENT
Start: 2024-08-30 | End: 2024-08-30 | Stop reason: HOSPADM

## 2024-08-30 RX ADMIN — FENTANYL CITRATE 50 MCG: 50 INJECTION INTRAMUSCULAR; INTRAVENOUS at 10:19

## 2024-08-30 RX ADMIN — VANCOMYCIN HYDROCHLORIDE 1500 MG: 1 INJECTION, POWDER, LYOPHILIZED, FOR SOLUTION INTRAVENOUS at 07:14

## 2024-08-30 RX ADMIN — SODIUM CHLORIDE, POTASSIUM CHLORIDE, SODIUM LACTATE AND CALCIUM CHLORIDE: 600; 310; 30; 20 INJECTION, SOLUTION INTRAVENOUS at 09:13

## 2024-08-30 RX ADMIN — DEXAMETHASONE SODIUM PHOSPHATE 4 MG: 4 INJECTION, SOLUTION INTRAMUSCULAR; INTRAVENOUS at 07:47

## 2024-08-30 RX ADMIN — PHENYLEPHRINE HYDROCHLORIDE 100 MCG: 10 INJECTION INTRAVENOUS at 09:03

## 2024-08-30 RX ADMIN — ONDANSETRON 4 MG: 2 INJECTION INTRAMUSCULAR; INTRAVENOUS at 07:47

## 2024-08-30 RX ADMIN — LIDOCAINE HYDROCHLORIDE 100 MG: 20 INJECTION, SOLUTION INFILTRATION; PERINEURAL at 07:49

## 2024-08-30 RX ADMIN — FENTANYL CITRATE 50 MCG: 50 INJECTION INTRAMUSCULAR; INTRAVENOUS at 08:23

## 2024-08-30 RX ADMIN — FENTANYL CITRATE 150 MCG: 50 INJECTION INTRAMUSCULAR; INTRAVENOUS at 07:46

## 2024-08-30 RX ADMIN — SUGAMMADEX 200 MG: 100 INJECTION, SOLUTION INTRAVENOUS at 09:17

## 2024-08-30 RX ADMIN — ACETAMINOPHEN 650 MG: 325 TABLET ORAL at 10:20

## 2024-08-30 RX ADMIN — PHENYLEPHRINE HYDROCHLORIDE 100 MCG: 10 INJECTION INTRAVENOUS at 08:31

## 2024-08-30 RX ADMIN — SODIUM CHLORIDE, POTASSIUM CHLORIDE, SODIUM LACTATE AND CALCIUM CHLORIDE: 600; 310; 30; 20 INJECTION, SOLUTION INTRAVENOUS at 07:42

## 2024-08-30 RX ADMIN — MIDAZOLAM 2 MG: 1 INJECTION INTRAMUSCULAR; INTRAVENOUS at 07:38

## 2024-08-30 RX ADMIN — PHENYLEPHRINE HYDROCHLORIDE 100 MCG: 10 INJECTION INTRAVENOUS at 08:44

## 2024-08-30 RX ADMIN — GLYCOPYRROLATE 0.2 MG: 0.2 INJECTION, SOLUTION INTRAMUSCULAR; INTRAVENOUS at 07:47

## 2024-08-30 RX ADMIN — PHENYLEPHRINE HYDROCHLORIDE 200 MCG: 10 INJECTION INTRAVENOUS at 08:08

## 2024-08-30 RX ADMIN — FENTANYL CITRATE 50 MCG: 50 INJECTION INTRAMUSCULAR; INTRAVENOUS at 09:56

## 2024-08-30 RX ADMIN — PHENYLEPHRINE HYDROCHLORIDE 100 MCG: 10 INJECTION INTRAVENOUS at 07:47

## 2024-08-30 RX ADMIN — PROPOFOL 250 MG: 10 INJECTION, EMULSION INTRAVENOUS at 07:52

## 2024-08-30 RX ADMIN — Medication 50 MG: at 07:53

## 2024-08-30 RX ADMIN — OXYCODONE HYDROCHLORIDE 10 MG: 5 TABLET ORAL at 10:20

## 2024-08-30 ASSESSMENT — ACTIVITIES OF DAILY LIVING (ADL)
ADLS_ACUITY_SCORE: 30
ADLS_ACUITY_SCORE: 27
ADLS_ACUITY_SCORE: 30
ADLS_ACUITY_SCORE: 29
ADLS_ACUITY_SCORE: 30
ADLS_ACUITY_SCORE: 30

## 2024-08-30 NOTE — BRIEF OP NOTE
[facility]    Brief Operative Note    Pre-operative diagnosis: Osteonecrosis (H) [M87.9]  Post-operative diagnosis Same as pre-operative diagnosis    Procedure: Core Decompression of Femoral Heads with Bone Marrow Derived Concentrate Grafting, Bilateral - Hip    Surgeon: Surgeons and Role:     * Kenyon Ayala MD - Primary     * Gibson Ware MD  Anesthesia: Choice   Estimated Blood Loss: Less than 10 ml    Drains: None  Specimens:   ID Type Source Tests Collected by Time Destination   A : PRE-SPIN: BILATERAL HIP BONE MARROW ASPIRATE Bone Marrow Femur, Bone Marrow Aspirate, Left WBC BONE MARROW, CD34 WITH , LABORATORY MISCELLANEOUS ORDER Kenyon Ayala MD 8/30/2024  6:51 AM    B : POST-SPIN: BILATERAL HIP BONE MARROW ASPIRATE Bone Marrow Femur, Bone Marrow Aspirate, Left WBC BONE MARROW, CD34 WITH , LABORATORY MISCELLANEOUS ORDER Kenyon Ayala MD 8/30/2024  6:54 AM      Findings:   None.  Complications: None.  Implants: * No implants in log *      Plan:   WBAT BLE  Discharge home today once pain is controlled  Follow up Dr. Ayala in 4 weeks

## 2024-08-30 NOTE — ANESTHESIA POSTPROCEDURE EVALUATION
Patient: Neil Pompa    Procedure: Procedure(s):  Core Decompression of Femoral Heads with Bone Marrow Derived Concentrate Grafting       Anesthesia Type:  General    Note:  Disposition: Inpatient   Postop Pain Control: Uneventful            Sign Out: Well controlled pain   PONV: No   Neuro/Psych: Uneventful            Sign Out: Acceptable/Baseline neuro status   Airway/Respiratory: Uneventful            Sign Out: Acceptable/Baseline resp. status   CV/Hemodynamics: Uneventful            Sign Out: Acceptable CV status; No obvious hypovolemia; No obvious fluid overload   Other NRE: NONE   DID A NON-ROUTINE EVENT OCCUR? No           Last vitals:  Vitals Value Taken Time   /80 08/30/24 1000   Temp 36.4  C (97.5  F) 08/30/24 0941   Pulse 60 08/30/24 1002   Resp 13 08/30/24 1002   SpO2 98 % 08/30/24 1002   Vitals shown include unfiled device data.    Electronically Signed By: Julio Olson DO  August 30, 2024  10:02 AM

## 2024-08-30 NOTE — ANESTHESIA PROCEDURE NOTES
Airway       Patient location during procedure: OR       Procedure Start/Stop Times: 8/30/2024 7:57 AM  Staff -        CRNA: Javy Diana APRN CRNA       Performed By: CRNAIndications and Patient Condition       Indications for airway management: alis-procedural       Induction type:intravenous       Mask difficulty assessment: 1 - vent by mask    Final Airway Details       Final airway type: endotracheal airway       Successful airway: ETT - single  Endotracheal Airway Details        ETT size (mm): 7.5       Cuffed: yes       Successful intubation technique: video laryngoscopy       VL Blade Size: Glidescope 3       Grade View of Cords: 1       Adjucts: stylet       Position: Right       Measured from: lips       Secured at (cm): 22       Bite block used: Oral Airway    Post intubation assessment        Placement verified by: capnometry, equal breath sounds and chest rise        Number of attempts at approach: 1       Number of other approaches attempted: 0       Secured with: tape       Ease of procedure: easy       Dentition: Intact    Medication(s) Administered   Medication Administration Time: 8/30/2024 7:57 AM

## 2024-08-30 NOTE — ANESTHESIA CARE TRANSFER NOTE
Patient: Neil Pompa    Procedure: Procedure(s):  Core Decompression of Femoral Heads with Bone Marrow Derived Concentrate Grafting       Diagnosis: Osteonecrosis (H) [M87.9]  Diagnosis Additional Information: No value filed.    Anesthesia Type:   General     Note:  Anesthesia Care Transfer Notewriter  Vitals:  Vitals Value Taken Time   /80 08/30/24 1000   Temp 36.4  C (97.5  F) 08/30/24 0941   Pulse 60 08/30/24 1002   Resp 13 08/30/24 1002   SpO2 98 % 08/30/24 1002   Vitals shown include unfiled device data.    Electronically Signed By: Jluio Olson DO  August 30, 2024  10:02 AM

## 2024-08-30 NOTE — BRIEF OP NOTE
Orthopaedic Surgery Brief Op-Note      Patient: Neil Pompa  : 1980  Date of Service: 2024 9:52 AM    Pre-operative Diagnosis: Osteonecrosis (H) [M87.9]  Post-operative Diagnosis: same    Procedure(s) Performed: Procedure(s):  Core Decompression of Femoral Heads with Bone Marrow Derived Concentrate Grafting    Staff: Dr. Ayala  Assistants:   Trey Winkler PA-C    Anesthesia: General  EBL: 10 cc  UOP: see anesthesia record  Tourniquet Time: n/a    Implants:   * No implants in log *  Drains: none  Intra-op Labs/Cxs/Specimens:   ID Type Source Tests Collected by Time Destination   A : PRE-SPIN: BILATERAL HIP BONE MARROW ASPIRATE Bone Marrow Femur, Bone Marrow Aspirate, Left WBC BONE MARROW, CD34 WITH , LABORATORY MISCELLANEOUS ORDER Kenyon Ayala MD 2024  6:51 AM    B : POST-SPIN: BILATERAL HIP BONE MARROW ASPIRATE Bone Marrow Femur, Bone Marrow Aspirate, Left WBC BONE MARROW, CD34 WITH , LABORATORY MISCELLANEOUS ORDER Kenyon Ayala MD 2024  6:54 AM      Complications: No apparent complications during procedure  Findings: Please see dictated operative note for details    Disposition: Stable to PACU, then discharge home today.     Post-Op Plan:  Assessment/Plan: Neil Pompa is a 43 year old male s/p Procedure(s):  Core Decompression of Femoral Heads with Bone Marrow Derived Concentrate Grafting on 2024 with Dr. Ayala.    Activity: Up with assist and assistive devices as needed until independent.   Weight bearing status: WBAT no high impact exercises for 6 weeks    Antibiotics:  pre op cefazolin  Diet: Begin with clear fluids and progress diet as tolerated. Bowel regimen. Anti-emetics PRN.    DVT prophylaxis:  none  Elevation: bilateral lower extremities     Wound Care: Alginate, tegaderm to be removed 7 days post op  Drains: none  Romero: none  Pain management: Orals PRN, IV for breakthrough only  X-rays: none  Physical Therapy: none   Occupational Therapy: none    Labs: none   Cultures: none  Consults: none     Future Appointments   Date Time Provider Department Center   9/13/2024  3:00 PM Yasmine Watson PA-C [facility]       Disposition:  Pending progress with pain control on orals, and medical stability, anticipate discharge to Home today.  Follow up: Plan for follow up with Shira Watson on 9/13/24    I assisted with positioning, prepping and draping, and closure.      Trey Winkler PA-C  8/30/2024 9:52 AM  Physician Assistant   Oncology and Adult Reconstructive Surgery  Dept Orthopaedic Surgery, [facility]     Thank you for allowing me to participate in this patient's care. Please page me directly any questions/concerns.   Securely message with the Vocera Web Console (learn more here)  Text page via [employer] Paging/Directory    If there is no response, if it is a weekend, or if it is during evening hours, please page the orthopaedic surgery resident on call via [employer] Paging/Directory

## 2024-08-30 NOTE — LETTER
Brie Cox, RT     [facility] PREOP/PHASE II  [address]  [facility] [address]  Phone: [phone]    August 30, 2024        Neil Pompa  [address]  [facility] [address]          Neil Pompa underwent surgery on today and may return to work on Date: 9/20/2024        Patient limitations:  none            Electronically signed by:    Kenyon Ayala MD  [facility] [place] Family Professor  Oncology and Adult Reconstructive Surgery  Dept Orthopaedic Surgery, [facility]  [phone] office, [phone] pager  [email or web address]    Please contact me for questions or concerns.      Sincerely,      Kenyon Ayala MD

## 2024-08-30 NOTE — ANESTHESIA PREPROCEDURE EVALUATION
Anesthesia Pre-Procedure Evaluation    Patient: Neil Pompa   MRN: [id number] : 1980        Procedure : Procedure(s):  Core Decompression of Femoral Heads with Bone Marrow Derived Concentrate Grafting          Past Medical History:   Diagnosis Date    Anti-cardiolipin antibody positive     Depression     Human immunodeficiency virus (HIV) disease (H)     Osteonecrosis (H)     Tonsillar hypertrophy       Past Surgical History:   Procedure Laterality Date    CERVICAL DISCECTOMY  2014    DENTAL SURGERY        No Known Allergies   Social History     Tobacco Use    Smoking status: Never     Passive exposure: Current    Smokeless tobacco: Never   Substance Use Topics    Alcohol use: Not Currently     Comment: 4 drinks when travels      Wt Readings from Last 1 Encounters:   24 86.3 kg (190 lb 4.1 oz)        Anesthesia Evaluation            ROS/MED HX  ENT/Pulmonary:     (+)     MARY ANN risk factors, snores loudly,                               (-) sleep apnea   Neurologic:       Cardiovascular:       METS/Exercise Tolerance:     Hematologic:       Musculoskeletal:       GI/Hepatic:       Renal/Genitourinary:       Endo:       Psychiatric/Substance Use:     (+) psychiatric history depression       Infectious Disease:       Malignancy:       Other:            Physical Exam    Airway        Mallampati: II   TM distance: > 3 FB   Neck ROM: full   Mouth opening: > 3 cm    Respiratory Devices and Support         Dental       (+) Modest Abnormalities - crowns, retainers, 1 or 2 missing teeth      Cardiovascular   cardiovascular exam normal          Pulmonary   pulmonary exam normal                OUTSIDE LABS:  CBC:   Lab Results   Component Value Date    WBC 15.1 (H) 2024    HGB 13.0 (L) 2024    HCT 38.5 (L) 2024     2024     BMP:   Lab Results   Component Value Date     2024    POTASSIUM 3.9 2024    CHLORIDE 103 2024    CO2 28 2024    BUN 10.0 2024  "   CR 1.18 (H) 08/13/2024     (H) 08/13/2024     COAGS:   Lab Results   Component Value Date    INR 1.02 12/28/2023     POC: No results found for: \"BGM\", \"HCG\", \"HCGS\"  HEPATIC: No results found for: \"ALBUMIN\", \"PROTTOTAL\", \"ALT\", \"AST\", \"GGT\", \"ALKPHOS\", \"BILITOTAL\", \"BILIDIRECT\", \"TAHIRA\"  OTHER:   Lab Results   Component Value Date    ELOY 9.6 08/13/2024       Anesthesia Plan    ASA Status:  2       Anesthesia Type: General.     - Airway: LMA              Consents    Anesthesia Plan(s) and associated risks, benefits, and realistic alternatives discussed. Questions answered and patient/representative(s) expressed understanding.     - Discussed:     - Discussed with:  Patient            Postoperative Care       PONV prophylaxis: Ondansetron (or other 5HT-3), Dexamethasone or Solumedrol     Comments:               Julio Olson DO    I have reviewed the pertinent notes and labs in the chart from the past 30 days and (re)examined the patient.  Any updates or changes from those notes are reflected in this note.              # Overweight: Estimated body mass index is 27.3 kg/m  as calculated from the following:    Height as of this encounter: 1.778 m (5' 10\").    Weight as of this encounter: 86.3 kg (190 lb 4.1 oz).      "

## 2024-08-30 NOTE — LETTER
Return to Work  2024     Seen today: Yes    Patient:  Neil Pompa  :   1980  MRN:     [id number]  Physician: Data Unavailable    Neil Pompa underwent surgery on today and may return to work on Date: 2024        Patient limitations:  none            Electronically signed by:    MD Anjel Baeza Family Professor  Oncology and Adult Reconstructive Surgery  Dept Orthopaedic Surgery, [facility]  [phone] office, [phone] pager  [email or web address]

## 2024-08-31 LAB
HCV RNA SERPL NAA+PROBE-ACNC: NOT DETECTED IU/ML
HIV1 RNA # PLAS NAA DL=20: NOT DETECTED COPIES/ML

## 2024-09-03 LAB
BILL ONLY STEM CELL CFU GEMM: NORMAL
HIV 1+2 AB+HIV1P24 AG SERPLBLD IA.RAPID: ABNORMAL
HIV 2 AB SERPLBLD QL IA.RAPID: NONREACTIVE
HIV1 AB SERPLBLD QL IA.RAPID: REACTIVE

## 2024-09-13 ENCOUNTER — OFFICE VISIT (OUTPATIENT)
Dept: ORTHOPEDICS | Facility: CLINIC | Age: 44
End: 2024-09-13
Payer: COMMERCIAL

## 2024-09-13 DIAGNOSIS — M87.9 OSTEONECROSIS (H): Primary | ICD-10-CM

## 2024-09-13 PROCEDURE — 99024 POSTOP FOLLOW-UP VISIT: CPT | Performed by: PHYSICIAN ASSISTANT

## 2024-09-13 NOTE — LETTER
9/13/2024      Neil Pompa  [address]  [facility] [address]      Dear Colleague,    Thank you for referring your patient, Neil Pompa, to the [facility] [place]. Please see a copy of my visit note below.    Chief Complaint:   SURGERY DATE: 8/30/2024  PREOPERATIVE DIAGNOSES:   1.  ARCO Stage II Bilateral femoral head osteonecrosis.   2.  HIV positive status  PROCEDURE PERFORMED:    Core decompression RIGHT femoral head. CPT 89010  RIGHT anterior iliac crest intramedullary bone based graft harvest.   Concentrated intramedullary bone based graft harvest and placement into the RIGHT femoral head osteonecrotic segment. CPT 81800  Core decompression LEFT femoral head CPT 54315  LEFT anterior iliac crest intramedullary bone based graft harvest.   Concentrated intramedullary bone based graft harvest and placement into the LEFT femoral head osteonecrotic segment. CPT 34254  ANESTHESIA:  General anesthesia.   SURGEON:  Kenyon Ayala MD     HPI: Neil is a 43 year old male who is here 12 days s/p above procedure.  He reports that his right hip is much improved and minimal discomfort.  He reports that his left head didn't have much pain before surgery, but now is more uncomfortable after.  He is back to work and taking the stairs a lot.  His right knee has been bothering him, and he is using voltaren gel on this.  He is taking tylenol for pain.  NO other concerns.      Physical Exam: 43 year old man who is alert and oriented and in no distress.  All incision are healing well with no erythema or drainage.  Suture tails were trimmed.  Minimal discomfort with passive rotation.  Has active extension of the knees and flexion of the hips.  Tenderness to palpation of quad tendon insertion on right superior patella.  No swelling or effusion. Pain here with resisted knee extension on the right.  NV intact. No calf tenderness.     Imaging: We did review the patient's intra-op images  today.    Impression: 43 year old male s/p bilateral femoral head core decompression with autograft, with right quad tendon tendinitis.    Plan: Patient can shower and get the incisions wet.  No soaking in a tub or pool for 2 weeks.  OK to use vitamin E oil or cocoa butter on the incisions.  Cover incisions as needed for comfort.  Continue to ice for pain control, especially the right knee.  Continue to work on range of motion and strengthening, as well as working on home exercises and stretches for the quads.  I will reach out to Dr. Ayala for long-term follow-up plan.  No high impact activities for 6 weeks. All questions were answered today.        Again, thank you for allowing me to participate in the care of your patient.        Sincerely,        Yasmine Watson PA-C

## 2024-09-13 NOTE — NURSING NOTE
Reason For Visit:   Chief Complaint   Patient presents with    Surgical Followup     DOS 8/30/2024- Core Decompression of Femoral Heads with Bone Marrow Derived Concentrate Grafting - Bilateral        There were no vitals taken for this visit.    Pain Assessment  Patient Currently in Pain: Yes  Patient's Stated Pain Goal: 3  0-10 Pain Scale: 3    Eulogio Tran CMA

## 2024-09-13 NOTE — PROGRESS NOTES
Chief Complaint:   SURGERY DATE: 8/30/2024  PREOPERATIVE DIAGNOSES:   1.  ARCO Stage II Bilateral femoral head osteonecrosis.   2.  HIV positive status  PROCEDURE PERFORMED:    Core decompression RIGHT femoral head. CPT 62304  RIGHT anterior iliac crest intramedullary bone based graft harvest.   Concentrated intramedullary bone based graft harvest and placement into the RIGHT femoral head osteonecrotic segment. CPT 15190  Core decompression LEFT femoral head CPT 26163  LEFT anterior iliac crest intramedullary bone based graft harvest.   Concentrated intramedullary bone based graft harvest and placement into the LEFT femoral head osteonecrotic segment. CPT 31944  ANESTHESIA:  General anesthesia.   SURGEON:  Kenyon Ayala MD     HPI: Neil is a 43 year old male who is here 12 days s/p above procedure.  He reports that his right hip is much improved and minimal discomfort.  He reports that his left head didn't have much pain before surgery, but now is more uncomfortable after.  He is back to work and taking the stairs a lot.  His right knee has been bothering him, and he is using voltaren gel on this.  He is taking tylenol for pain.  NO other concerns.      Physical Exam: 43 year old man who is alert and oriented and in no distress.  All incision are healing well with no erythema or drainage.  Suture tails were trimmed.  Minimal discomfort with passive rotation.  Has active extension of the knees and flexion of the hips.  Tenderness to palpation of quad tendon insertion on right superior patella.  No swelling or effusion. Pain here with resisted knee extension on the right.  NV intact. No calf tenderness.     Imaging: We did review the patient's intra-op images today.    Impression: 43 year old male s/p bilateral femoral head core decompression with autograft, with right quad tendon tendinitis.    Plan: Patient can shower and get the incisions wet.  No soaking in a tub or pool for 2 weeks.  OK to use vitamin E oil  or cocoa butter on the incisions.  Cover incisions as needed for comfort.  Continue to ice for pain control, especially the right knee.  Continue to work on range of motion and strengthening, as well as working on home exercises and stretches for the quads.  I will reach out to Dr. Ayala for long-term follow-up plan.  No high impact activities for 6 weeks. All questions were answered today.

## 2024-09-16 ENCOUNTER — TELEPHONE (OUTPATIENT)
Dept: ORTHOPEDICS | Facility: CLINIC | Age: 44
End: 2024-09-16
Payer: COMMERCIAL

## 2024-09-16 NOTE — TELEPHONE ENCOUNTER
Left Voicemail (1st Attempt) for the patient to call back and schedule the following: No myc on file    Appointment type: VIDEO VISIT RETURN  Provider:   Return date: After MRI  Specialty phone number: [phone]  Additional appointment(s) needed: MRI

## 2024-09-19 NOTE — TELEPHONE ENCOUNTER
Left Voicemail (2nd Attempt) for the patient to call back and schedule the following:    Appointment type: MRI and Return [facility] Tumor (in person or video. Can be on the same day as MRI if in person)  Provider: Dr. Ayala  Return date: 3/16/24

## 2025-01-16 DIAGNOSIS — Z96.649 STATUS POST TOTAL REPLACEMENT OF HIP, UNSPECIFIED LATERALITY: Primary | ICD-10-CM

## 2025-02-24 ENCOUNTER — THERAPY VISIT (OUTPATIENT)
Dept: PHYSICAL THERAPY | Facility: CLINIC | Age: 45
End: 2025-02-24
Attending: ORTHOPAEDIC SURGERY
Payer: COMMERCIAL

## 2025-02-24 DIAGNOSIS — M87.9 OSTEONECROSIS OF BOTH HIPS (H): Primary | ICD-10-CM

## 2025-02-24 DIAGNOSIS — Z98.890 POSTOPERATIVE STATE: ICD-10-CM

## 2025-02-24 DIAGNOSIS — Z96.649 STATUS POST TOTAL REPLACEMENT OF HIP, UNSPECIFIED LATERALITY: ICD-10-CM

## 2025-02-24 PROCEDURE — 97110 THERAPEUTIC EXERCISES: CPT | Mod: GP | Performed by: PHYSICAL THERAPIST

## 2025-02-24 PROCEDURE — 97162 PT EVAL MOD COMPLEX 30 MIN: CPT | Mod: GP | Performed by: PHYSICAL THERAPIST

## 2025-02-24 ASSESSMENT — ACTIVITIES OF DAILY LIVING (ADL)
HOW_WOULD_YOU_RATE_THE_OVERALL_FUNCTION_OF_YOUR_KNEE_DURING_YOUR_USUAL_DAILY_ACTIVITIES?: NEARLY NORMAL
DEEP SQUATTING: MODERATE DIFFICULTY
SIT WITH YOUR KNEE BENT: ACTIVITY IS VERY DIFFICULT
KNEEL ON THE FRONT OF YOUR KNEE: ACTIVITY IS FAIRLY DIFFICULT
LIGHT_TO_MODERATE_WORK: MODERATE DIFFICULTY
RECREATIONAL_ACTIVITIES: EXTREME DIFFICULTY
LOW_IMPACT_ACTIVITIES_LIKE_FAST_WALKING: MODERATE DIFFICULTY
RISE FROM A CHAIR: ACTIVITY IS SOMEWHAT DIFFICULT
SPORTS_SCORE(%): 0
STEPPING UP AND DOWN CURBS: MODERATE DIFFICULTY
GIVING WAY, BUCKLING OR SHIFTING OF KNEE: THE SYMPTOM AFFECTS MY ACTIVITY MODERATELY
ABILITY_TO_PARTICIPATE_IN_YOUR_DESIRED_SPORT_AS_LONG_AS_YOU_WOULD_LIKE: UNABLE TO DO
GO UP STAIRS: ACTIVITY IS SOMEWHAT DIFFICULT
LIMPING: THE SYMPTOM PREVENTS ME FROM ALL DAILY ACTIVITIES
KNEE_ACTIVITY_OF_DAILY_LIVING_SCORE: 41.43
STARTING_AND_STOPPING_QUICKLY: EXTREME DIFFICULTY
TWISTING/PIVOTING_ON_INVOLVED_LEG: MODERATE DIFFICULTY
GOING DOWN 1 FLIGHT OF STAIRS: SLIGHT DIFFICULTY
RAW_SCORE: 29
ADL_SCORE(%): 0
AS_A_RESULT_OF_YOUR_KNEE_INJURY,_HOW_WOULD_YOU_RATE_YOUR_CURRENT_LEVEL_OF_DAILY_ACTIVITY?: ABNORMAL
TWISTING/PIVOTING ON INVOLVED LEG: MODERATE DIFFICULTY
STEPPING_UP_AND_DOWN_CURBS: MODERATE DIFFICULTY
WALKING_APPROXIMATELY_10_MINUTES: MODERATE DIFFICULTY
HOS_ADL_ITEM_SCORE_TOTAL: 35
HEAVY_WORK: EXTREME DIFFICULTY
ABILITY_TO_PERFORM_ACTIVITY_WITH_YOUR_NORMAL_TECHNIQUE: EXTREME DIFFICULTY
STIFFNESS: THE SYMPTOM AFFECTS MY ACTIVITY MODERATELY
SWELLING: I DO NOT HAVE THE SYMPTOM
SITTING FOR 15 MINUTES: MODERATE DIFFICULTY
PLEASE_INDICATE_YOR_PRIMARY_REASON_FOR_REFERRAL_TO_THERAPY:: HIP
HOW_WOULD_YOU_RATE_THE_OVERALL_FUNCTION_OF_YOUR_KNEE_DURING_YOUR_USUAL_DAILY_ACTIVITIES?: NEARLY NORMAL
RUNNING_ONE_MILE: UNABLE TO DO
HOS_ADL_HIGHEST_POTENTIAL_SCORE: 68
SPORTS_HIGHEST_POTENTIAL_SCORE: 36
WALKING_15_MINUTES_OR_GREATER: EXTREME DIFFICULTY
WALKING_UP_STEEP_HILLS: MODERATE DIFFICULTY
STAND: ACTIVITY IS VERY DIFFICULT
LIGHT_TO_MODERATE_WORK: MODERATE DIFFICULTY
PAIN: THE SYMPTOM AFFECTS MY ACTIVITY SEVERELY
KNEE_ACTIVITY_OF_DAILY_LIVING_SUM: 29
WALKING_INITIALLY: MODERATE DIFFICULTY
WEAKNESS: THE SYMPTOM AFFECTS MY ACTIVITY SEVERELY
WALKING_DOWN_STEEP_HILLS: SLIGHT DIFFICULTY
PUTTING_ON_SOCKS_AND_SHOES: EXTREME DIFFICULTY
WALKING_15_MINUTES_OR_GREATER: EXTREME DIFFICULTY
ROLLING_OVER_IN_BED: EXTREME DIFFICULTY
SWELLING: I DO NOT HAVE THE SYMPTOM
WALK: ACTIVITY IS FAIRLY DIFFICULT
WALKING_FOR_APPROXIMATELY_10_MINUTES: MODERATE DIFFICULTY
PLEASE_INDICATE_YOR_PRIMARY_REASON_FOR_REFERRAL_TO_THERAPY:: KNEE
RISE FROM A CHAIR: ACTIVITY IS SOMEWHAT DIFFICULT
GO UP STAIRS: ACTIVITY IS SOMEWHAT DIFFICULT
STANDING_FOR_15_MINUTES: MODERATE DIFFICULTY
ADL_HIGHEST_POTENTIAL_SCORE: 68
JUMPING: EXTREME DIFFICULTY
HEAVY_WORK: EXTREME DIFFICULTY
GIVING WAY, BUCKLING OR SHIFTING OF KNEE: THE SYMPTOM AFFECTS MY ACTIVITY MODERATELY
SITTING_FOR_15_MINUTES: MODERATE DIFFICULTY
SIT WITH YOUR KNEE BENT: ACTIVITY IS VERY DIFFICULT
KNEEL ON THE FRONT OF YOUR KNEE: ACTIVITY IS FAIRLY DIFFICULT
WALK: ACTIVITY IS FAIRLY DIFFICULT
PAIN: THE SYMPTOM AFFECTS MY ACTIVITY SEVERELY
GO DOWN STAIRS: ACTIVITY IS SOMEWHAT DIFFICULT
GETTING INTO AND OUT OF AN AVERAGE CAR: SLIGHT DIFFICULTY
HOW_WOULD_YOU_RATE_YOUR_CURRENT_LEVEL_OF_FUNCTION?: ABNORMAL
ADL_TOTAL_ITEM_SCORE: 0
AS_A_RESULT_OF_YOUR_KNEE_INJURY,_HOW_WOULD_YOU_RATE_YOUR_CURRENT_LEVEL_OF_DAILY_ACTIVITY?: ABNORMAL
GETTING_INTO_AND_OUT_OF_A_BATHTUB: SLIGHT DIFFICULTY
WALKING_UP_STEEP_HILLS: MODERATE DIFFICULTY
GETTING_INTO_AND_OUT_OF_A_BATHTUB: SLIGHT DIFFICULTY
SPORTS_TOTAL_ITEM_SCORE: 0
RECREATIONAL ACTIVITIES: EXTREME DIFFICULTY
SQUAT: ACTIVITY IS SOMEWHAT DIFFICULT
GETTING_INTO_AND_OUT_OF_AN_AVERAGE_CAR: SLIGHT DIFFICULTY
GOING_UP_1_FLIGHT_OF_STAIRS: SLIGHT DIFFICULTY
PUTTING ON SOCKS AND SHOES: EXTREME DIFFICULTY
GO DOWN STAIRS: ACTIVITY IS SOMEWHAT DIFFICULT
SPORTS_COUNT: 9
LIMPING: THE SYMPTOM PREVENTS ME FROM ALL DAILY ACTIVITIES
WALKING_DOWN_STEEP_HILLS: SLIGHT DIFFICULTY
DEEP_SQUATTING: MODERATE DIFFICULTY
ADL_COUNT: 17
WALKING_INITIALLY: MODERATE DIFFICULTY
ROLLING OVER IN BED: EXTREME DIFFICULTY
STANDING FOR 15 MINUTES: MODERATE DIFFICULTY
GOING UP 1 FLIGHT OF STAIRS: SLIGHT DIFFICULTY
GOING_DOWN_1_FLIGHT_OF_STAIRS: SLIGHT DIFFICULTY
WEAKNESS: THE SYMPTOM AFFECTS MY ACTIVITY SEVERELY
HOS_ADL_SCORE(%): 51.47
SQUAT: ACTIVITY IS SOMEWHAT DIFFICULT
STAND: ACTIVITY IS VERY DIFFICULT
STIFFNESS: THE SYMPTOM AFFECTS MY ACTIVITY MODERATELY

## 2025-02-24 NOTE — PROGRESS NOTES
PHYSICAL THERAPY EVALUATION  Type of Visit: Evaluation       Fall Risk Screen:  Fall screen completed by: PT  Have you fallen 2 or more times in the past year?: No  Have you fallen and had an injury in the past year?: No  Is patient a fall risk?: No    Subjective         Presenting condition or subjective complaint:  11/2023 insidious onset B hip pain.  Osteonecrosis of femoral head pre-collapse stage.  Progressed to 8/30/24 Core Decompression of Femoral Heads bilaterally with Bone Marrow Derived Concentrate Grafting.    Since the procedure is not having pain to the hips, but still having pain that translates into the R medial knee.  Date of onset: 01/16/25 (MD visit)    Relevant medical history:   Not disclosed by patient but in chart: PTSD, MARY ANN, major depressive disorder, HIV positive  Dates & types of surgery:  none other than hips    Prior diagnostic imaging/testing results: X-ray   1/9/25 Avascular necrosis in the femoral head is again visualized. No definitive subchondral collapse. No definite fracture elsewhere. Mild narrowing of the hip joint. Knee joint is grossly unremarkable.   Prior therapy history for the same diagnosis, illness or injury: No      Prior Level of Function  Transfers: Independent  Ambulation: Independent  ADL: Independent      Living Environment  Social support: Alone   Type of home: House   Stairs to enter the home: Yes 35 Is there a railing: Yes     Ramp: No   Stairs inside the home: Yes 30 Is there a railing: Yes     Help at home: None  Equipment owned:       Employment: Yes    Hobbies/Interests:  running, exercise    Patient goals for therapy:  roll in bed, wants to be able to run again (is ),      Pain assessment: See objective evaluation for additional pain details     Objective     Objective   HIP EVALUATION  PAIN:   Pain Level at worst: 3-5/10 at worst  Pain Location: lateral hip pains on R, no pain on L, R medial knee  Pain Quality: hip aching, knee  throbbing, aching  Pain Frequency: knee constant, hip intermittent  Pain is Worst:during the day with certain activity  Pain is Exacerbated By: rolling in bed feels heavy liquid in R knee, when start walking after sitting any length of time, if standing for awhile and then start walking is painful.  Will fall if stand on R foot to put clothes will fall.  Shifts to L LE with standing.   Sometimes navigates stairs one step at a time due to knee pain.  Not able to exercise  Pain is Relieved By: surgery on hips helped some but no change to knee pain.  Pain Progression: up and down   Tingling/numbness none  Associated symptoms: perceives weakness, not steady on R foot      POSTURE: generally good spine posture.  Standing weight shifts onto L LE  GAIT:   Weightbearing Status: WBAT  Assistive Device(s): None  Gait Deviations: wide DYLAN, increased weight shift and R trunk lean with R stance    WEIGHTBEARING ALIGNMENT: Patient stands with all weight on left LE and hardly any weight into R LE  ROM:   (Degrees) Left AROM Left PROM  Right AROM Right PROM   Hip Flexion       Hip Extension WNL  WNL    Hip Abduction WNL  WNL    Hip Adduction       Hip Internal Rotation 30  15 pain in R medial knee    Hip External Rotation 55  55 pain    Knee Flexion       Knee Extension       Lumbar Side glide     Lumbar Flexion    Lumbar Extension    Knee flexion L 125                                                                       R 120   Knee extension L 0, 5 hyperextension                           R 0, 10 hyperextension mild pain   Pain:   End feel:     STRENGTH:   Pain: - none + mild ++ moderate +++ severe  Strength Scale: 0-5/5 Left Right   Hip Flexion 5 5   Hip Extension 5 5   Hip Abduction 5- 3+ breaks due to pain at knee   Hip Adduction     Hip Internal Rotation     Hip External Rotation     Knee Flexion 5 5   Knee Extension 5 5     LE FLEXIBILITY:   SPECIAL TESTS: Knee:       Left:  all tests negative      Right: pain with quad  set  10-15 deg extensor sag and knee pain with SLR flexion   Pain with quad set lessens with patellar grind  (-) ligamentous tests  Mild pain but no excessive laxity with anterior drawer R knee  Passive SLR test negative bilaterally    FUNCTIONAL TESTS:   Step Test:   Double Leg Squat: standing shifts weight to L LE, squat has good technique although shifts to L LE  Single Leg Squat: L good technique, R significant R trunk lean (trendelenberg) and unsteady on R withpain  SLS: L 60 seconds, R 45 seconds trunk shifts significantly to R (trendelenberg)  PALPATION: Tenderness R medial knee at joint line, distal quad, tenderness/pain with medial patellar glide when at rest, however pain with quad set lessens with therapist assist into medial patellar glide.  JOINT MOBILITY:     Assessment & Plan   CLINICAL IMPRESSIONS  Medical Diagnosis: B hip pain, osteonecrosis, s/p Core Decompression of Femoral Heads bilaterally with Bone Marrow Derived Concentrate Grafting  on 8/30/24    Treatment Diagnosis: B hip pain, osteonecrosis, R knee pain   Impression/Assessment: Patient is a 44 year old male with R knee complaints before and after B hip surgery.  Significant R gluteal and quadricep weakness, R knee hyperextension >L and significant gait and standing compensations.  The following significant findings have been identified: Pain, Decreased ROM/flexibility, Decreased strength, Decreased proprioception, Impaired gait, Impaired muscle performance, and Decreased activity tolerance. These impairments interfere with their ability to perform self care tasks, work tasks, recreational activities, household chores, driving , household mobility, and community mobility as compared to previous level of function.     Clinical Decision Making (Complexity):  Clinical Presentation: Evolving/Changing  Clinical Presentation Rationale: based on medical and personal factors listed in PT evaluation  Clinical Decision Making (Complexity): Moderate  complexity    PLAN OF CARE  Treatment Interventions:  Interventions: Manual Therapy, Neuromuscular Re-education, Therapeutic Activity, Therapeutic Exercise    Long Term Goals     PT Goal 1  Goal Identifier: standing  Goal Description: Patient to stand 15 minutes with even weight bearing and 0/10 pain  Rationale: to maximize safety and independence with performance of ADLs and functional tasks;to maximize safety and independence within the home;to maximize safety and independence within the community;to maximize safety and independence with self cares  Goal Progress: Patient stands with weight all on L LE, pain up to 3-5/10 in knee  Target Date: 05/19/25  PT Goal 2  Goal Identifier: ambulation  Goal Description: Patient to walk 15 minutes with normal gait pattern 0/10 pain  Rationale: to maximize safety and independence within the home;to maximize safety and independence with performance of ADLs and functional tasks;to maximize safety and independence within the community  Goal Progress: Patient with significant gait deviation with decreased R stance time, significant R trunk lean, increased lateral weight shifting, up to 5/10 pain/antalgic gait  Target Date: 05/19/25      Frequency of Treatment: 1x/week  Duration of Treatment: 12 weeks      Education Assessment:        Risks and benefits of evaluation/treatment have been explained.   Patient/Family/caregiver agrees with Plan of Care.     Evaluation Time:     PT Eval, Moderate Complexity Minutes (39190): 20       Signing Clinician: Kayla Santos PT        [facility]                                                                                   OUTPATIENT PHYSICAL THERAPY      PLAN OF TREATMENT FOR OUTPATIENT REHABILITATION   Patient's Last Name, First Name, Neil Graham YOB: 1980   Provider's Name   [facility]   Medical Record No.  [id number]     Onset Date: 01/16/25 (MD visit)   Start of Care Date: 02/24/25     Medical Diagnosis:  B hip pain, osteonecrosis, s/p Core Decompression of Femoral Heads bilaterally with Bone Marrow Derived Concentrate Grafting  on 8/30/24      PT Treatment Diagnosis:  B hip pain, osteonecrosis, R knee pain Plan of Treatment  Frequency/Duration: 1x/week/ 12 weeks    Certification date from 02/24/25 to 05/19/25         See note for plan of treatment details and functional goals     Kayla Santos, PT                         I CERTIFY THE NEED FOR THESE SERVICES FURNISHED UNDER        THIS PLAN OF TREATMENT AND WHILE UNDER MY CARE     (Physician attestation of this document indicates review and certification of the therapy plan).              Referring Provider:  Kenyon Ayala    Initial Assessment  See Epic Evaluation- Start of Care Date: 02/24/25

## 2025-03-03 ENCOUNTER — THERAPY VISIT (OUTPATIENT)
Dept: PHYSICAL THERAPY | Facility: CLINIC | Age: 45
End: 2025-03-03
Attending: ORTHOPAEDIC SURGERY
Payer: COMMERCIAL

## 2025-03-03 DIAGNOSIS — M87.9 OSTEONECROSIS OF BOTH HIPS (H): Primary | ICD-10-CM

## 2025-03-03 DIAGNOSIS — Z98.890 POSTOPERATIVE STATE: ICD-10-CM

## 2025-03-03 PROCEDURE — 97112 NEUROMUSCULAR REEDUCATION: CPT | Mod: GP | Performed by: PHYSICAL THERAPIST

## 2025-03-03 PROCEDURE — 97110 THERAPEUTIC EXERCISES: CPT | Mod: GP | Performed by: PHYSICAL THERAPIST

## 2025-03-15 ENCOUNTER — ANCILLARY PROCEDURE (OUTPATIENT)
Dept: MRI IMAGING | Facility: CLINIC | Age: 45
End: 2025-03-15
Attending: ORTHOPAEDIC SURGERY
Payer: COMMERCIAL

## 2025-03-15 DIAGNOSIS — M87.9 OSTEONECROSIS (H): ICD-10-CM

## 2025-03-15 PROCEDURE — 72197 MRI PELVIS W/O & W/DYE: CPT | Performed by: RADIOLOGY

## 2025-03-15 PROCEDURE — A9585 GADOBUTROL INJECTION: HCPCS | Mod: JW | Performed by: RADIOLOGY

## 2025-03-15 RX ORDER — GADOBUTROL 604.72 MG/ML
10 INJECTION INTRAVENOUS ONCE
Status: COMPLETED | OUTPATIENT
Start: 2025-03-15 | End: 2025-03-15

## 2025-03-15 RX ADMIN — GADOBUTROL 9 ML: 604.72 INJECTION INTRAVENOUS at 12:34

## 2025-03-18 NOTE — PROGRESS NOTES
[facility]  Orthopaedic Surgery, Joint Replacement Consultation  by Kenyon Ayala M.D.    Neil Pompa MRN# [id number]    YOB: 1980     Requesting physician: Jese Waterman     Background history:  DX:  Osteonecrosis of the femoral head, bilaterally, Arco stage II, greater than 30% of femoral head involvement bilaterally.  Poss related to HIV medication (protease inhibitors).  3/24/2025, right femoral head progression to Arco stage IIIa  Knees, no osteonecrosis.    TREATMENTS:  11/5/2013, Right ring finger trigger finger release and right ring finger mass excision, (NABEEL Romero), [facility]  5/31/2014, Right C6-C7 hemilaminectomy and microdiskectomy, (Roxanna PATRICK) [facility]  11/3/3021, Right forehead soft tissue mass excision, [facility]  8/30/24, Core Decompression of Femoral Heads with Bone Marrow Derived Concentrate Grafting, (Jamie), [facility]    Virtual visit today to review findings of knee MRI examinations after his core decompression procedure performed bilaterally for Arco stage II disease.    He reports that he continues have some discomfort involving his right hip.  He has no pain in the ball of the left hip joint.    Examination deferred due to virtual visit status.    MRI examination reviewed and demonstrates no change in the appearance of his osteonecrotic defects in the bilateral femoral heads.  Of note is possible subchondral fracture involving the right femoral head with some joint effusion noted.    Impression and plan:  Obtain radiographs of the right and left femoral heads to assess for subchondral fracture.  Stable disease left femoral head.  Concern for possible progression of subchondral fracture of the right femoral head.  Virtual follow-up next week to review radiographs and neck steps.    Addendum 1730:  Patient had radiographs performed in the right femoral head demonstrates flattening with subchondral fracture and collapse  less than 2 mm in the anterior posterior view.  Thereby representing progression to Arco stage IIIa disease in the right femoral head.  I will discuss management options with the patient at his next clinic visit.    MD Anjel Baeza Family Professor  Oncology and Adult Reconstructive Surgery  Dept Orthopaedic Surgery, [facility]  [phone] office, [phone] pager  [email or web address]    Virtual-Visit Details    Type of service:  Video Visit  Visit total duration (including visit time, pre and post visit work time as documented above on the same day of service): 40  min  Video start time: 1430  Video end time:  1450  Non video work time 20 min  Originating Location (pt. Location): Home  Distant Location (provider location):  [facility] ORTHOPEDIC [facility]   Platform used for Virtual Visit: [employer]

## 2025-03-24 ENCOUNTER — VIRTUAL VISIT (OUTPATIENT)
Dept: ORTHOPEDICS | Facility: CLINIC | Age: 45
End: 2025-03-24
Payer: COMMERCIAL

## 2025-03-24 ENCOUNTER — ANCILLARY PROCEDURE (OUTPATIENT)
Dept: GENERAL RADIOLOGY | Facility: CLINIC | Age: 45
End: 2025-03-24
Attending: ORTHOPAEDIC SURGERY
Payer: COMMERCIAL

## 2025-03-24 DIAGNOSIS — M25.551 BILATERAL HIP PAIN: ICD-10-CM

## 2025-03-24 DIAGNOSIS — M25.552 BILATERAL HIP PAIN: ICD-10-CM

## 2025-03-24 DIAGNOSIS — M87.9 OSTEONECROSIS (H): ICD-10-CM

## 2025-03-24 DIAGNOSIS — M87.9 OSTEONECROSIS (H): Primary | ICD-10-CM

## 2025-03-24 PROCEDURE — 98007 SYNCH AUDIO-VIDEO EST HI 40: CPT | Performed by: ORTHOPAEDIC SURGERY

## 2025-03-24 PROCEDURE — 73522 X-RAY EXAM HIPS BI 3-4 VIEWS: CPT | Performed by: RADIOLOGY

## 2025-03-24 ASSESSMENT — HOOS JR
BENDING TO THE FLOOR TO PICK UP OBJECT: MODERATE
LYING IN BED (TURNING OVER, MAINTAINING HIP POSITION): MODERATE
RISING FROM SITTING: MILD
WALKING ON UNEVEN SURFACE: MILD
HOOS JR TOTAL INTERVAL SCORE: 64.66
SITTING: MILD
GOING UP OR DOWN STAIRS: MILD

## 2025-03-24 NOTE — LETTER
3/24/2025      Neil Pompa  [address]  [place] [address]      Dear Colleague,    Thank you for referring your patient, Neil Pompa, to the [facility] [place]. Please see a copy of my visit note below.            [facility]  Orthopaedic Surgery, Joint Replacement Consultation  by Kenyon Ayala M.D.    Neil Pompa MRN# [id number]    YOB: 1980     Requesting physician: Jese Waterman     Background history:  DX:  Osteonecrosis of the femoral head, bilaterally, Arco stage II, greater than 30% of femoral head involvement bilaterally.  Poss related to HIV medication (protease inhibitors).  3/24/2025, right femoral head progression to Arco stage IIIa  Knees, no osteonecrosis.    TREATMENTS:  11/5/2013, Right ring finger trigger finger release and right ring finger mass excision, (NABEEL Romero), [facility]  5/31/2014, Right C6-C7 hemilaminectomy and microdiskectomy, (Roxanna PATRICK) [facility]  11/3/3021, Right forehead soft tissue mass excision, [facility]  8/30/24, Core Decompression of Femoral Heads with Bone Marrow Derived Concentrate Grafting, (Jamie), [facility]    Virtual visit today to review findings of knee MRI examinations after his core decompression procedure performed bilaterally for Arco stage II disease.    He reports that he continues have some discomfort involving his right hip.  He has no pain in the ball of the left hip joint.    Examination deferred due to virtual visit status.    MRI examination reviewed and demonstrates no change in the appearance of his osteonecrotic defects in the bilateral femoral heads.  Of note is possible subchondral fracture involving the right femoral head with some joint effusion noted.    Impression and plan:  Obtain radiographs of the right and left femoral heads to assess for subchondral fracture.  Stable disease left femoral head.  Concern for possible progression of  subchondral fracture of the right femoral head.  Virtual follow-up next week to review radiographs and neck steps.    Addendum 1730:  Patient had radiographs performed in the right femoral head demonstrates flattening with subchondral fracture and collapse less than 2 mm in the anterior posterior view.  Thereby representing progression to Arco stage IIIa disease in the right femoral head.  I will discuss management options with the patient at his next clinic visit.    Kenyon Ayala MD  [facility] Family Professor  Oncology and Adult Reconstructive Surgery  Dept Orthopaedic Surgery, [facility]  [phone] office, [phone] pager  [email or web address]    Total combined visit time and work time before and after clinic visit, independent of trainee, on encounter date = 20 min           Again, thank you for allowing me to participate in the care of your patient.        Sincerely,        Kenyon Ayala MD    Electronically signed

## 2025-03-25 NOTE — PROGRESS NOTES
[facility]  Orthopaedic Surgery, Joint Replacement Consultation  by Kenyon Ayala M.D.    Neil Pompa MRN# [id number]    YOB: 1980     Requesting physician: Jese Waterman     Background history:  DX:  Osteonecrosis of the femoral head, bilaterally, Arco stage II, greater than 30% of femoral head involvement bilaterally.  Poss related to HIV medication (protease inhibitors).    He reports that he continues have some discomfort involving his right hip.  He has no pain in the left hip joint.    Examination deferred due to virtual visit status.    X-ray examination reviewed and demonstrates progression osteonecrosis of right femoral head to Arco stage IV disease as were presented by subchondral fracture and collapse of his femoral head.  There is no evidence of collapse of the left femoral head.     Impression and plan:  Failed right core decompression with bone marrow derived concentrated grafting procedure.  Progression to Arco stage IIIa disease with subchondral fracture.  I have advised patient to consider proceeding with total hip arthroplasty as time is choosing based upon his symptoms.  He is indicated that he would like to proceed at this time with right total of arthroplasty surgery.  Therefore we discussed the risk benefits alternatives and pros and cons of hip replacement surgery.  We discussed the long-term consequences of subsequent repeat Jointflex procedures as well as possibly of likely discrepancy or developing an infection.    Stable disease left femoral head.  Preoperative dental evaluation  patient education teaching  PACs clinic evaluation  Set up surgical date for right total of arthroplasty, posterior approach, case request submitted.    MD Anjel Baeza Family Professor  Oncology and Adult Reconstructive Surgery  Dept Orthopaedic Surgery, [facility]  [phone] office, [phone] pager  [email or web address]    Virtual-Visit  Details    Type of service:  Video Visit  Visit total duration (including visit time, pre and post visit work time as documented above on the same day of service): 40  min  Video start time: 1525  Video end time:  1553  Originating Location (pt. Location): home  non video work time: 15min  Distant Location (provider location):  [facility] ORTHOPEDIC [facility]   Platform used for Virtual Visit: [employer]

## 2025-04-03 ENCOUNTER — VIRTUAL VISIT (OUTPATIENT)
Dept: ORTHOPEDICS | Facility: CLINIC | Age: 45
End: 2025-04-03
Payer: COMMERCIAL

## 2025-04-03 ENCOUNTER — ANCILLARY ORDERS (OUTPATIENT)
Dept: ORTHOPEDICS | Facility: CLINIC | Age: 45
End: 2025-04-03

## 2025-04-03 DIAGNOSIS — M16.7 OTHER SECONDARY OSTEOARTHRITIS OF RIGHT HIP: Primary | ICD-10-CM

## 2025-04-03 DIAGNOSIS — Z00.6 RESEARCH EXAM: Primary | ICD-10-CM

## 2025-04-03 ASSESSMENT — PAIN SCALES - GENERAL: PAINLEVEL_OUTOF10: NO PAIN (0)

## 2025-04-03 NOTE — NURSING NOTE
Current patient location: [address] [facility] [address]    Is the patient currently in the state of [place]? YES    Visit mode: VIDEO    If the visit is dropped, the patient can be reconnected by:VIDEO VISIT: Text to cell phone:   Telephone Information:   Mobile [phone]       Will anyone else be joining the visit? NO  (If patient encounters technical issues they should call [phone])    Are changes needed to the allergy or medication list? No    Are refills needed on medications prescribed by this physician? NO    Rooming Documentation:  Questionnaire(s) completed    Reason for visit: No chief complaint on file.    Cara AUGUSTINF

## 2025-04-03 NOTE — LETTER
4/3/2025      Neil Pompa  [address]  [place] [address]      Dear Colleague,    Thank you for referring your patient, Neil Pompa, to the [facility] [place]. Please see a copy of my visit note below.            [facility]  Orthopaedic Surgery, Joint Replacement Consultation  by Kenyon Ayala M.D.    Neil Pompa MRN# [id number]    YOB: 1980     Requesting physician: Jese Waterman     Background history:  DX:  Osteonecrosis of the femoral head, bilaterally, Arco stage II, greater than 30% of femoral head involvement bilaterally.  Poss related to HIV medication (protease inhibitors).    He reports that he continues have some discomfort involving his right hip.  He has no pain in the left hip joint.    Examination deferred due to virtual visit status.    X-ray examination reviewed and demonstrates progression osteonecrosis of right femoral head to Arco stage IV disease as were presented by subchondral fracture and collapse of his femoral head.  There is no evidence of collapse of the left femoral head.     Impression and plan:  Failed right core decompression with bone marrow derived concentrated grafting procedure.  Progression to Arco stage IIIa disease with subchondral fracture.  I have advised patient to consider proceeding with total hip arthroplasty as time is choosing based upon his symptoms.  He is indicated that he would like to proceed at this time with right total of arthroplasty surgery.  Therefore we discussed the risk benefits alternatives and pros and cons of hip replacement surgery.  We discussed the long-term consequences of subsequent repeat Jointflex procedures as well as possibly of likely discrepancy or developing an infection.    Stable disease left femoral head.  Preoperative dental evaluation  patient education teaching  PACs clinic evaluation  Set up surgical date for right total of arthroplasty,  posterior approach, case request submitted.    MD Anjel Baeza Family Professor  Oncology and Adult Reconstructive Surgery  Dept Orthopaedic Surgery, [facility]  [phone] office, [phone] pager  [email or web address]    Virtual-Visit Details    Type of service:  Video Visit  Visit total duration (including visit time, pre and post visit work time as documented above on the same day of service): 40  min  Video start time: 1525  Video end time:  1553  Originating Location (pt. Location): home  non video work time: 15min  Distant Location (provider location):  [facility] ORTHOPEDIC [facility]   Platform used for Virtual Visit: [employer]                 Again, thank you for allowing me to participate in the care of your patient.        Sincerely,        Kenyon Ayala MD    Electronically signed

## 2025-04-09 ENCOUNTER — TELEPHONE (OUTPATIENT)
Dept: ORTHOPEDICS | Facility: CLINIC | Age: 45
End: 2025-04-09
Payer: COMMERCIAL

## 2025-04-09 NOTE — TELEPHONE ENCOUNTER
Patient is scheduled for surgery with Dr. Ayala    Spoke with: patient    Date of Surgery: 7/8/25    Location: [facility] OR    Post op: 8/4/25    H&P: 6/16/25 PAC    Additional imaging/appointments: N/A    Surgery packet: received     Additional comments: patient declined earlier dates    Barbra  Perioperative   [phone]

## 2025-04-15 NOTE — TELEPHONE ENCOUNTER
FUTURE VISIT INFORMATION      SURGERY INFORMATION:  Date: 25  Location: [facility] OR  Surgeon:  Kenyon Ayala MD  Anesthesia Type:  Choice  Procedure: ARTHROPLASTY, HIP, TOTAL, Posterior Approach, RIGHT  Consult: 4/3/25    RECORDS REQUESTED FROM:       Primary Care Provider: [facility]     Pertinent Medical History: MARY ANN; HIV positive; Anti-cardiolipin antibody positive     Most recent EKG+ Tracin21 -     Most recent ECHO: 21 - [facility]     Most recent Cardiac Stress Test: 21 - [facility]    Most recent Sleep Study: 24 - [facility]

## 2025-04-30 ENCOUNTER — TELEPHONE (OUTPATIENT)
Dept: ORTHOPEDICS | Facility: CLINIC | Age: 45
End: 2025-04-30
Payer: MEDICAID

## 2025-04-30 NOTE — TELEPHONE ENCOUNTER
Reason for Call:  Appointment Request    Patient requesting this type of appt:  Requesting for change of surgery appt     Requested provider:  Kenyon Ayala MD     Reason patient unable to be scheduled: Needs to be scheduled by clinic    When does patient want to be seen/preferred time:  in June 2025    Comments: Per Patient ; I am schedule for surgery on 7/08/25 but was wondering if I can reschedule surgery to sometime on June 24 2025 instead? Please let me know either way.   I'm also having more hip pain and I was wondering if I can come in for a hip injection? Im interested in getting one done asap.       Okay to leave a detailed message?: Yes at Cell number on file:    Telephone Information:   Mobile [phone]       Call taken on 4/30/2025 at 3:44 PM by Thee Roberts

## 2025-05-01 NOTE — TELEPHONE ENCOUNTER
Outgoing patient call to reschedule surgery with Dr. Ayala    Spoke with: patient     Reason for Surgical Date: patient choice    Date of Surgery: 6/24/25    Estimated Arrival time Discussed with Patient:  No    [facility] of surgery[facility]     Pre-Op H&P: 6/3/25 PAC + lab appointment    Imaging: No     Additional Appointments: No     Post-Op Appt Date w/ NP/PA:  Trey Winkler PA-C  7/21/25 at 9:30am    Post-Op Appt Date w/ Surgeon  N/A     Discussed with patient PAC RN will provide arrival time and instructions for surgery at the time of the appointment: [[place] locations only]: Yes    Standard Surgery Packet Sent: Not Applicable 05/01/25  via Received in clinic by N/A     Additional Comments: rescheduled from 7/8/25    Barbra Burnham on 5/1/2025 at 12:37 PM

## 2025-05-14 ASSESSMENT — ACTIVITIES OF DAILY LIVING (ADL)
WALKING_DOWN_STEEP_HILLS: MODERATE DIFFICULTY
ROLLING_OVER_IN_BED: MODERATE DIFFICULTY
RISE FROM A CHAIR: ACTIVITY IS FAIRLY DIFFICULT
AS_A_RESULT_OF_YOUR_KNEE_INJURY,_HOW_WOULD_YOU_RATE_YOUR_CURRENT_LEVEL_OF_DAILY_ACTIVITY?: NEARLY NORMAL
STEPPING UP AND DOWN CURBS: MODERATE DIFFICULTY
GOING_DOWN_1_FLIGHT_OF_STAIRS: MODERATE DIFFICULTY
KNEEL ON THE FRONT OF YOUR KNEE: ACTIVITY IS FAIRLY DIFFICULT
DEEP_SQUATTING: EXTREME DIFFICULTY
WALKING_DOWN_STEEP_HILLS: MODERATE DIFFICULTY
KNEE_ACTIVITY_OF_DAILY_LIVING_SUM: 25
WEAKNESS: I DO NOT HAVE THE SYMPTOM
LIMPING: THE SYMPTOM AFFECTS MY ACTIVITY SEVERELY
ABILITY_TO_PERFORM_ACTIVITY_WITH_YOUR_NORMAL_TECHNIQUE: MODERATE DIFFICULTY
GETTING_INTO_AND_OUT_OF_AN_AVERAGE_CAR: MODERATE DIFFICULTY
PLEASE_INDICATE_YOR_PRIMARY_REASON_FOR_REFERRAL_TO_THERAPY:: HIP
LOW_IMPACT_ACTIVITIES_LIKE_FAST_WALKING: EXTREME DIFFICULTY
LIGHT_TO_MODERATE_WORK: EXTREME DIFFICULTY
LIMPING: THE SYMPTOM AFFECTS MY ACTIVITY SEVERELY
WALK: ACTIVITY IS FAIRLY DIFFICULT
WALKING_APPROXIMATELY_10_MINUTES: EXTREME DIFFICULTY
ADL_SCORE(%): 0
SITTING FOR 15 MINUTES: EXTREME DIFFICULTY
KNEEL ON THE FRONT OF YOUR KNEE: ACTIVITY IS FAIRLY DIFFICULT
GOING_UP_1_FLIGHT_OF_STAIRS: MODERATE DIFFICULTY
WALKING_15_MINUTES_OR_GREATER: EXTREME DIFFICULTY
WALKING_UP_STEEP_HILLS: MODERATE DIFFICULTY
CUTTING/LATERAL_MOVEMENTS: EXTREME DIFFICULTY
SPORTS_TOTAL_ITEM_SCORE: 0
PUTTING ON SOCKS AND SHOES: MODERATE DIFFICULTY
ADL_TOTAL_ITEM_SCORE: 0
SPORTS_COUNT: 9
AS_A_RESULT_OF_YOUR_KNEE_INJURY,_HOW_WOULD_YOU_RATE_YOUR_CURRENT_LEVEL_OF_DAILY_ACTIVITY?: NEARLY NORMAL
GETTING_INTO_AND_OUT_OF_A_BATHTUB: MODERATE DIFFICULTY
STANDING FOR 15 MINUTES: MODERATE DIFFICULTY
STEPPING_UP_AND_DOWN_CURBS: MODERATE DIFFICULTY
HEAVY_WORK: MODERATE DIFFICULTY
GETTING_INTO_AND_OUT_OF_A_BATHTUB: MODERATE DIFFICULTY
LIGHT_TO_MODERATE_WORK: EXTREME DIFFICULTY
GO DOWN STAIRS: ACTIVITY IS FAIRLY DIFFICULT
PLEASE_INDICATE_YOR_PRIMARY_REASON_FOR_REFERRAL_TO_THERAPY:: KNEE
RUNNING_ONE_MILE: MODERATE DIFFICULTY
SPORTS_SCORE(%): 0
LANDING: EXTREME DIFFICULTY
SWINGING_OBJECTS_LIKE_A_GOLF_CLUB: EXTREME DIFFICULTY
GO DOWN STAIRS: ACTIVITY IS FAIRLY DIFFICULT
RECREATIONAL_ACTIVITIES: MODERATE DIFFICULTY
TWISTING/PIVOTING ON INVOLVED LEG: EXTREME DIFFICULTY
HOS_ADL_HIGHEST_POTENTIAL_SCORE: 68
PAIN: THE SYMPTOM AFFECTS MY ACTIVITY MODERATELY
SQUAT: ACTIVITY IS FAIRLY DIFFICULT
TWISTING/PIVOTING_ON_INVOLVED_LEG: EXTREME DIFFICULTY
HOW_WOULD_YOU_RATE_YOUR_CURRENT_LEVEL_OF_FUNCTION?: NEARLY NORMAL
SWELLING: THE SYMPTOM AFFECTS MY ACTIVITY MODERATELY
SWELLING: THE SYMPTOM AFFECTS MY ACTIVITY MODERATELY
WALKING_INITIALLY: EXTREME DIFFICULTY
STANDING_FOR_15_MINUTES: MODERATE DIFFICULTY
WEAKNESS: I DO NOT HAVE THE SYMPTOM
WALK: ACTIVITY IS FAIRLY DIFFICULT
SQUAT: ACTIVITY IS FAIRLY DIFFICULT
GETTING INTO AND OUT OF AN AVERAGE CAR: MODERATE DIFFICULTY
SPORTS_HIGHEST_POTENTIAL_SCORE: 36
PAIN: THE SYMPTOM AFFECTS MY ACTIVITY MODERATELY
SIT WITH YOUR KNEE BENT: ACTIVITY IS FAIRLY DIFFICULT
ADL_COUNT: 17
ROLLING OVER IN BED: MODERATE DIFFICULTY
ABILITY_TO_PARTICIPATE_IN_YOUR_DESIRED_SPORT_AS_LONG_AS_YOU_WOULD_LIKE: UNABLE TO DO
HOS_ADL_SCORE(%): 41.18
STARTING_AND_STOPPING_QUICKLY: EXTREME DIFFICULTY
RISE FROM A CHAIR: ACTIVITY IS FAIRLY DIFFICULT
SIT WITH YOUR KNEE BENT: ACTIVITY IS FAIRLY DIFFICULT
WALKING_UP_STEEP_HILLS: MODERATE DIFFICULTY
GIVING WAY, BUCKLING OR SHIFTING OF KNEE: THE SYMPTOM AFFECTS MY ACTIVITY SLIGHTLY
WALKING_INITIALLY: EXTREME DIFFICULTY
WALKING_FOR_APPROXIMATELY_10_MINUTES: EXTREME DIFFICULTY
HOW_WOULD_YOU_RATE_THE_OVERALL_FUNCTION_OF_YOUR_KNEE_DURING_YOUR_USUAL_DAILY_ACTIVITIES?: NEARLY NORMAL
WALKING_15_MINUTES_OR_GREATER: EXTREME DIFFICULTY
PUTTING_ON_SOCKS_AND_SHOES: MODERATE DIFFICULTY
HEAVY_WORK: MODERATE DIFFICULTY
GIVING WAY, BUCKLING OR SHIFTING OF KNEE: THE SYMPTOM AFFECTS MY ACTIVITY SLIGHTLY
RECREATIONAL ACTIVITIES: MODERATE DIFFICULTY
JUMPING: EXTREME DIFFICULTY
SITTING_FOR_15_MINUTES: EXTREME DIFFICULTY
DEEP SQUATTING: EXTREME DIFFICULTY
GOING DOWN 1 FLIGHT OF STAIRS: MODERATE DIFFICULTY
HOW_WOULD_YOU_RATE_THE_OVERALL_FUNCTION_OF_YOUR_KNEE_DURING_YOUR_USUAL_DAILY_ACTIVITIES?: NEARLY NORMAL
GOING UP 1 FLIGHT OF STAIRS: MODERATE DIFFICULTY
HOS_ADL_ITEM_SCORE_TOTAL: 28
ADL_HIGHEST_POTENTIAL_SCORE: 68

## 2025-05-19 ENCOUNTER — THERAPY VISIT (OUTPATIENT)
Dept: PHYSICAL THERAPY | Facility: CLINIC | Age: 45
End: 2025-05-19
Attending: ORTHOPAEDIC SURGERY
Payer: COMMERCIAL

## 2025-05-19 DIAGNOSIS — M87.9 OSTEONECROSIS OF BOTH HIPS (H): Primary | ICD-10-CM

## 2025-05-19 DIAGNOSIS — Z98.890 POSTOPERATIVE STATE: ICD-10-CM

## 2025-05-19 DIAGNOSIS — Z96.641 STATUS POST TOTAL REPLACEMENT OF RIGHT HIP: ICD-10-CM

## 2025-05-19 PROCEDURE — 97110 THERAPEUTIC EXERCISES: CPT | Mod: GP | Performed by: PHYSICAL THERAPIST

## 2025-05-19 PROCEDURE — 97112 NEUROMUSCULAR REEDUCATION: CPT | Mod: GP | Performed by: PHYSICAL THERAPIST
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
## 2025-06-02 ENCOUNTER — THERAPY VISIT (OUTPATIENT)
Dept: PHYSICAL THERAPY | Facility: CLINIC | Age: 45
End: 2025-06-02
Attending: ORTHOPAEDIC SURGERY
Payer: COMMERCIAL

## 2025-06-02 DIAGNOSIS — M87.9 OSTEONECROSIS OF BOTH HIPS (H): Primary | ICD-10-CM

## 2025-06-02 DIAGNOSIS — Z98.890 POSTOPERATIVE STATE: ICD-10-CM

## 2025-06-02 LAB
ABO + RH BLD: NORMAL
BLD GP AB SCN SERPL QL: NEGATIVE
SPECIMEN EXP DATE BLD: NORMAL

## 2025-06-02 PROCEDURE — 97112 NEUROMUSCULAR REEDUCATION: CPT | Mod: GP | Performed by: PHYSICAL THERAPIST

## 2025-06-02 PROCEDURE — 97110 THERAPEUTIC EXERCISES: CPT | Mod: GP | Performed by: PHYSICAL THERAPIST

## 2025-06-03 ENCOUNTER — APPOINTMENT (OUTPATIENT)
Dept: LAB | Facility: CLINIC | Age: 45
End: 2025-06-03
Payer: COMMERCIAL

## 2025-06-03 ENCOUNTER — TELEPHONE (OUTPATIENT)
Dept: ORTHOPEDICS | Facility: CLINIC | Age: 45
End: 2025-06-03

## 2025-06-03 ENCOUNTER — ANESTHESIA EVENT (OUTPATIENT)
Dept: SURGERY | Facility: CLINIC | Age: 45
End: 2025-06-03
Payer: COMMERCIAL

## 2025-06-03 ENCOUNTER — OFFICE VISIT (OUTPATIENT)
Dept: SURGERY | Facility: CLINIC | Age: 45
End: 2025-06-03
Payer: COMMERCIAL

## 2025-06-03 ENCOUNTER — PRE VISIT (OUTPATIENT)
Dept: SURGERY | Facility: CLINIC | Age: 45
End: 2025-06-03

## 2025-06-03 ENCOUNTER — LAB (OUTPATIENT)
Dept: LAB | Facility: CLINIC | Age: 45
End: 2025-06-03
Payer: COMMERCIAL

## 2025-06-03 VITALS
BODY MASS INDEX: 28.49 KG/M2 | SYSTOLIC BLOOD PRESSURE: 106 MMHG | DIASTOLIC BLOOD PRESSURE: 71 MMHG | HEART RATE: 83 BPM | HEIGHT: 70 IN | TEMPERATURE: 97.8 F | RESPIRATION RATE: 16 BRPM | OXYGEN SATURATION: 97 % | WEIGHT: 199 LBS

## 2025-06-03 DIAGNOSIS — Z01.818 PREOP EXAMINATION: ICD-10-CM

## 2025-06-03 DIAGNOSIS — M87.9 OSTEONECROSIS (H): ICD-10-CM

## 2025-06-03 DIAGNOSIS — Z01.818 PREOP EXAMINATION: Primary | ICD-10-CM

## 2025-06-03 LAB
ANION GAP SERPL CALCULATED.3IONS-SCNC: 11 MMOL/L (ref 7–15)
BUN SERPL-MCNC: 10.4 MG/DL (ref 6–20)
CALCIUM SERPL-MCNC: 9.3 MG/DL (ref 8.8–10.4)
CHLORIDE SERPL-SCNC: 105 MMOL/L (ref 98–107)
CREAT SERPL-MCNC: 1.13 MG/DL (ref 0.67–1.17)
EGFRCR SERPLBLD CKD-EPI 2021: 83 ML/MIN/1.73M2
ERYTHROCYTE [DISTWIDTH] IN BLOOD BY AUTOMATED COUNT: 11.9 % (ref 10–15)
GLUCOSE SERPL-MCNC: 92 MG/DL (ref 70–99)
HCO3 SERPL-SCNC: 26 MMOL/L (ref 22–29)
HCT VFR BLD AUTO: 40.6 % (ref 40–53)
HGB BLD-MCNC: 13.6 G/DL (ref 13.3–17.7)
MCH RBC QN AUTO: 29.8 PG (ref 26.5–33)
MCHC RBC AUTO-ENTMCNC: 33.5 G/DL (ref 31.5–36.5)
MCV RBC AUTO: 89 FL (ref 78–100)
PLATELET # BLD AUTO: 180 10E3/UL (ref 150–450)
POTASSIUM SERPL-SCNC: 4.1 MMOL/L (ref 3.4–5.3)
RBC # BLD AUTO: 4.56 10E6/UL (ref 4.4–5.9)
SODIUM SERPL-SCNC: 142 MMOL/L (ref 135–145)
WBC # BLD AUTO: 7.2 10E3/UL (ref 4–11)

## 2025-06-03 PROCEDURE — 36415 COLL VENOUS BLD VENIPUNCTURE: CPT | Performed by: PATHOLOGY

## 2025-06-03 PROCEDURE — 86900 BLOOD TYPING SEROLOGIC ABO: CPT

## 2025-06-03 PROCEDURE — 85027 COMPLETE CBC AUTOMATED: CPT | Performed by: PATHOLOGY

## 2025-06-03 PROCEDURE — 80048 BASIC METABOLIC PNL TOTAL CA: CPT | Performed by: PATHOLOGY

## 2025-06-03 ASSESSMENT — ENCOUNTER SYMPTOMS
SEIZURES: 0
DYSRHYTHMIAS: 0

## 2025-06-03 ASSESSMENT — PAIN SCALES - GENERAL: PAINLEVEL_OUTOF10: NO PAIN (0)

## 2025-06-03 ASSESSMENT — LIFESTYLE VARIABLES: TOBACCO_USE: 0

## 2025-06-03 NOTE — PATIENT INSTRUCTIONS
Preparing for Your Surgery      Name:  Neil Pompa   MRN:  [id number]   :  1980   Today's Date:  6/3/2025     The [place] [employer] [address] [employer] Project                                Timeline 2025 -2025    This project will affect travel to the [facility], as well as the [facility].    Please check the [facility] [address] project website for the most up to date information and give yourself additional time to reach your destination.    Arriving for surgery:  Surgery date:  25  Arrival time:  9.00AM    Please come to:       [facility] [address]   [address] [place]  [address]     The Green Ramp for patients and visitors is beneath the [facility]. The parking facility entrance is at the intersection of [address] [place] [address] and [address]. Patients and visitors who self-park will receive the reduced hospital parking rate (no ticket validation needed).   [employer] parking, located at the [facility] main entrance on [address] [place] [address], is available Monday - Friday from 7 am to 3:30 pm.   Discounted parking pass options can be purchased from  attendants during business hours.   -Check in at the security desk in the [facility] ([facility])   Lobby. They will direct you to the correct elevators.   -Proceed to the 3rd floor, Adult Surgery Waiting Lounge. [phone]     If you need directions, a wheelchair or escort please stop at the Information Desk in the lobby.  Inform the information person you are here for surgery; a wheelchair and escort to [address] will be provided.   An escort to the Adult Surgery Waiting Lounge will be provided. .    What can I eat or drink?  -  You may eat and drink normally up to 8 hours prior to arrival time. (Until 1.00AM)  -  You may have clear  liquids until 2 hours prior to arrival time. (Until 7.00AM)    Examples of clear liquids:  Water  Clear broth  Juices (apple, white grape, white cranberry  and cider) without pulp  Noncarbonated, powder based beverages  (lemonade and Jermaine-Aid)  Sodas (Sprite, 7-Up, ginger ale and seltzer)  Coffee or tea (without milk or cream)  Gatorade    -  No Alcohol or cannabis products for at least 24 hours before surgery.     Which medicines can I take?    Hold Aspirin for 7 days before surgery.   Hold Multivitamins for 7 days before surgery.  Hold Supplements for 7 days before surgery.  Hold Ibuprofen (Advil, Motrin) for 3 day(s) before surgery--unless otherwise directed by surgeon.  Hold Naproxen (Aleve) for 4 days before surgery.    Hold Celecoxib (Celebrex) for 3 days before surgery.    -  DO NOT take these medications the day of surgery:  Continue to hold Celebrex    -  PLEASE TAKE these medications the day of surgery:  Genvoya    How do I prepare myself?  - Please take 2 showers (one the night prior to surgery and one the morning of surgery) using Scrubcare or Hibiclens soap.    Use this soap only from the neck to your toes. Avoid genital area      Leave the soap on your skin for one minute--then rinse thoroughly.      You may use your own shampoo and conditioner. No other hair products.   - Please remove all jewelry and body piercings.  - No lotions, deodorants or fragrance.  - No makeup or fingernail polish.   - Bring your ID and insurance card.    -For patients being admitted to the [facility]  Family members are to take the patient belongings with them and place them in the lockers provided in the Family Lounge.  Please limit the items you bring to 1 bag as the lockers are small.      -If you use a CPAP machine, please bring the CPAP machine, tubing, and mask to hospital.    -If you have a Deep Brain Stimulator, Spinal Cord Stimulator, or any Neuro Stimulator device---you must bring the remote control to the  hospital.      ALL PATIENTS GOING HOME THE SAME DAY OF SURGERY ARE REQUIRED TO HAVE A RESPONSIBLE ADULT TO DRIVE AND BE IN ATTENDANCE WITH THEM FOR 24 HOURS FOLLOWING SURGERY.    Covid testing policy as of 12/06/2022  Your surgeon will notify and schedule you for a COVID test if one is needed before surgery--please direct any questions or COVID symptoms to your surgeon      Questions or Concerns:    - For any questions regarding the day of surgery or your hospital stay, please contact the Pre Admission Nursing Office at [phone].       - If you have health changes between today and your surgery, please call your surgeon.       - For questions after surgery, please call your surgeons office.           Current Visitor Guidelines    2 adult visitors for adult patients in the pre op area    If additional visitors come (beyond a patient care attendant or a group home caregiver), the additional visitors will be asked to wait in the main lobby of the hospital    Visiting hours: 8 a.m. to 8:30 p.m.    Patients confirmed or suspected to have symptoms of COVID 19 or flu:     No visitors allowed for adult patients.   Children (under age 18) can have 1 named visitor.     People who are sick or showing symptoms of COVID 19 or flu:    Are not allowed to visit patients--we can only make exceptions in special situations.       Please follow these guidelines for your visit:          Please maintain social distance          Masking is optional--however at times you may be asked to wear a mask for the safety of yourself and others     Clean your hands with alcohol hand . Do this when you arrive at and leave the building and patient room,    And again after you touch your mask or anything in the room.     Go directly to and from the room you are visiting.     Stay in the patient s room during your visit. Limit going to other places in the hospital as much as possible     Leave bags and jackets at home or in the car.      For everyone s health, please don t come and go during your visit. That includes for smoking   during your visit.           OPTIMAL RECOVERY AFTER SURGERY        Begin hydrating yourself by drinking at least 8-10 glasses of clear liquids for 24 hours before surgery:      Suggested clear liquids:   Water    Clear Juices   Clear Broth   Non- carbonated beverages    (Crystal Light or Jermaine Aid)   Sodas    (Sprite, 7 up, ginger ale, seltzer)   Gatorade              Drink clear liquids up until 4 hours before your surgery.       We would like you to purchase a drink such as Gatorade or Ensure Clear (not the milkshake type).  Drink this before bedtime and the morning of surgery drink between 8-10 ounces or until you feel hydrated.        Keeping well hydrated leads to your veins being plump, you wake up faster, and you are less likely to be nauseated. Start drinking water as soon as you can after surgery and advance to clear liquids and food as tolerated.  IV fluids contain salt, drinking fluids will minimize the amount of IV fluids you need and decrease the amount of salt you get.                 The most common reason for the patient to be readmitted is dehydration. Staying hydrated after you go home from the hospital is very important.  Ensure or Ensure Clear are good options to keep you hydrated.

## 2025-06-03 NOTE — TELEPHONE ENCOUNTER
- A call was placed to the patient.     He has a few questions:    - Does insurance determine implants?   I let him know Dr. Ayala routinely uses the same implant, if this is changed it is not due to insurance it is due to fit.     - Was there a fracture?  Read XR report:Impression:  1. No acute osseous abnormality.  2. Bilateral femoral head osteonecrosis with subtle right femoral head  subchondral bone plate flattening.  3. Mild right hip joint space narrowing, progressed since comparison  radiograph.    - He has no other questions.     - Patient verbalized understanding of plan and all questions were answered. Call back number to clinic was given and patient was told to call if they had an further questions.

## 2025-06-03 NOTE — TELEPHONE ENCOUNTER
"----- Message from Astrid Solomon sent at 6/3/2025  3:15 PM CDT -----  We just saw this patient in PAC. He has some surgical questions. Could someone contact him and answer these. He said \"ASAP\". Just passing this on. Thanks  "
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
## 2025-06-04 ENCOUNTER — RESULTS FOLLOW-UP (OUTPATIENT)
Dept: SURGERY | Facility: CLINIC | Age: 45
End: 2025-06-04

## 2025-06-13 NOTE — PROGRESS NOTES
SURGERY PLAN (PRE-OP PLAN)    Patient Position (indicated by x):    Supine     Supine with torso rolled up on a bump     Floppy lateral on torso length bean bag     Lateral decubitus, bean bag, full length   x  Lateral decubitus, Wixson hip positioner     Safety paddle side supports x 2 clamped to side rail     Lithotomy, both legs in yellow padded leg melchor     Lithotomy, single leg in yellow padded leg melchor     Prone on blanket rolls/round gel pad     Prone on Junior (arched) frame on Garrett table     Single thigh in orange arthroscopy clamp     Beach chair semi recumbent     Spider limb positioner     Arm out on radiolucent arm table     Split drape with top bar   x  Revision LEESA drape with plastic side bags for leg     Extremity drape     Shoulder pack drape     Laparotomy drape     Romero catheter          General Equipment Requests (indicated by x):      C-Arm with C-Armor drape     C-Arm (video capable, [employer]00 model)     O-Arm with Stealth imaging     Fracture Table     Garrett XR Table     SurgiGraphic 6000 (diving board) fluoro table     Cell saver     Jamie Biopsy trephine set w/ K-wire & pituitary rongeurs     Small pituitary rongeur     Jamie's angled curettes, narrow shaft     Bone graft, kapner gouges     Midas Carlos Medtronic juventino, electric motor     Phenol 5%     Huttig BMAC stem cell     Vancomycin 1 gram powder     Zometa 4 mg vials     Depo Medrol steroid     Blunt Pelvic Retractor (.55, Blunt Hohmann with  slight bend)     (1) Portable hand held radiation detector machine for sentinel node biopsy and (2) Lymphazurin     [place] Osteotomes     Trauma/Fixation Requests (indicated by x):    Large Frag AO plates     Small Frag AO plates     Locking periarticular plates - AO     Locking periarticular prox humerus plate - Queen/Nephew     Pelvic recon plates (curved & straight), 3.5 & 4.5 mm     Dayday alis-prosthetic fracture plate     DHS hip screw     Janice Gamma nail     AO, DCS 95  degree plate/screw     AO, 95 degree condylar blade plate     AO, 3.0, 3.5, 4.0, 4.5 mm Cannulated screws     AO, 6.5, 7.0, 7.3 mm Cannulated screws- Stainless Steel     AO, 6.5 mm Cannulated screws- Titanium     Dayday cerclage cable plates     AO IM nails     AO Large Ex Fix     AO Small Ex Fix     Large pelvic bone clamps     Large fx reduction forcepts - AO     Bone clamps - Large (Garrett, Garrett, Sharon)     Bone clamps - Medium (Garrett)     Carbofix carbon fiber plates for:          LEESA Requests (indicated by x):  x  Biomet taperloc ingrowth stem     Biomet Integral cemented stem   x  Biomet G7 cup, 28+32+36 heads     Biomet Bipolar cup     Biomet Constrained Freedom liner with heads     Queen NephFashion GPS BHR resurfacing LEESA with Dropost.it navigation unit (need 2 weeks advance notice)     LogMeInuy S-ROM long stems     Janice Restoration modular long stem     Biomet EVER long stems, both interlocked and splined stems     Biomet Regenerex TM cup + augments     Janice Tritanium TM cup +  augments     [place] GAP II acet cage + cemented poly cup     Biomet OSS prox femur replacement LEESA     Biomet OSS Compress fixation     IM flexible reamers + guidewire, < 9 mm     IM flexible reamers + guidewire, > 9 mm     Allograft: femoral head     Allograft: distal femur     Allograft: proximal tibia     Bone mill     Allograft cancellous chips     Allograft cancellous crushed     Enrrique Montesinos Troch Claw + cable, insertion instruments     Enrrique Montesinos beaded cerclage cable, green cable  x2 , insertion instruments     Dayday CTR Troch cable plate     AO pelvic instruments and clamps (angled) Blunt Hohmann & angled Sun[place], large bone reduction forceps       [place] specific Requests (indicated by x):    Pistol  Pituitary with teeth    Extra large hammer    Small bone drill with [place] drill adapter    East west non-curved, flat blade retractor set    Metal cup (not plastic)    Long Shepherds crook type retractor     Drill bit set - box    Long flat rectangular femoral elevator    Small bone hook    Large black handle bone hook    [facility] Miles cerclage cable with insertion instruments, in room (on standby)               Specimens and cultures (indicated by x):     Tissue cultures, aerobic and anaerobic without gram stain     Frozen section     pathology specimens - fresh     pathology specimens - formalin

## 2025-06-16 ENCOUNTER — PRE VISIT (OUTPATIENT)
Dept: SURGERY | Facility: CLINIC | Age: 45
End: 2025-06-16

## 2025-06-16 ENCOUNTER — TELEPHONE (OUTPATIENT)
Dept: ORTHOPEDICS | Facility: CLINIC | Age: 45
End: 2025-06-16

## 2025-06-16 NOTE — TELEPHONE ENCOUNTER
- A call was placed to the patient.     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 There is no option for desk or remote work.     - I let him know we would write a letter for him to be off of work until his post-op appointment on 7/21. At this appointment we will re-assess if he is able to return to work.     - Patient verbalized understanding of plan and all questions were answered. Call back number to clinic was given and patient was told to call if they had an further questions.

## 2025-06-16 NOTE — TELEPHONE ENCOUNTER
Other: Returning call     Is a  at a car wash  Wants work note to go to  [employer]    Could we send this information to you in [employer] or would you prefer to receive a phone call?:   Patient would prefer a phone call   Okay to leave a detailed message?: No at Cell number on file:    Telephone Information:   Mobile [phone]

## 2025-06-16 NOTE — TELEPHONE ENCOUNTER
----- Message from Pavan PATRICK sent at 6/16/2025 11:12 AM CDT -----  Regarding: Time off of work  Neil Pleitez is asking for an email or letter stating how long he will need to be off of work post-op. Are you able to reach out to him to discuss ?    Thanks,   Pavan

## 2025-06-16 NOTE — TELEPHONE ENCOUNTER
- A call was placed to the patient. Patient did not answer phone so a voicemail was left.     -The following information was communicated:  I got a message that you were hoping to get a letter about work for post-op. Returning to work depends on the type of work you do. If you work at a desk, you may be able to return to work within a few days after surgery, although your attention span may be reduced if you are still taking pain medication. If you need to travel or commute to work in a car, it s best to wait at least a few weeks or a month. If your work is physically demanding, it may be as long as three months.    Please let me know what you do for work and it you are able to work virtually/remotely at all.     - Call back number to clinic was given and patient was told to call back if they had questions about the following information and ask for BECKY Mandujano.

## 2025-06-16 NOTE — LETTER
[facility] [address]  4TH FLOOR  [facility] [id number] [phone]        June 16, 2025    Regarding:  Neil Pompa  [address] [facility] [address]              To Whom It May Concern;  Neil Pompa is under my care for his upcoming hip replacement surgery on 6/24. He will need to be off of work until his post-op appointment on 7/21. At this appointment, we will re-assess if he is able to return to work.     If you have further questions, please call the clinic at [phone].       Sincerely,  Kenyon Ayala MD

## 2025-06-16 NOTE — PROGRESS NOTES
Ortho Navigator Note      Pre-op Date 6/3/25     Medical Clearance  Cleared     Labs Stable      COVID Test Date No longer indicated      Skin  Intact      Activity: Ambulates independently without assistive device      Equipment Need Patient will likely need a walker for discharge. Defer to PT/OT for recs.       Meds to Hold Held all supplements 14 days prior to surgery  Celebrex-Hold for 3 days prior to DOS   * Medication recommendations are not intended to be exhaustive; they are limited to common medications that are potentially dangerous if incorrectly managed   NPO Instructions  Instructed to stop solids 8 hr prior to arrival and clears 2 hr prior to arrival.       Pre-op Joint Education Complete? Declined. Stated that he know what he's doing.    Discharge Plan Patient has plan to discharge home on morning of POD 1.   Stepmother Emily will arrive at hospital between 8-9am to participate in therapy and discharge education. They will then transport patient home    /Transportation Emily and multiple friends and family with assist in caring for patient. He does have help overnight for 2-3 nights.    Barriers to Discharge No barriers to discharge.      Additional Info/   Special Needs : Patient had no unanswered questions or concerns.           06/16/25 1104   Discharge Planning   Patient/Family Anticipates Transition to home with family   Concerns to be Addressed no discharge needs identified   Living Arrangements   People in Home alone   Type of Residence Private Residence   Is your private residence a single family home or apartment? Single family home   Number of Stairs, Within Home, Primary three  (Into)   Stair Railings, Within Home, Primary railings safe and in good condition   Once home, are you able to live on one level? Yes   Which level? Main Level   Bathroom Shower/Tub Tub/Shower unit   Equipment Currently Used at Home none   Support System   Support Systems Parent;Friends/Neighbors;Family Members    Do you have someone available to stay with you one or two nights once you are home? Yes   Medical Clearance   Date of Physical 06/03/25   Clinic Name PAC   It is recommended that you call and check with any specialty providers before surgery to see if you need surgical clearance.  Do you see any specialty providers outside of your primary care provider? No   Blood   Known Bleeding Disorder or Coagulopathy No   Does the patient have any Voodoo/cultural preferences related to blood products? No   Education   Has the patient scheduled or completed pre-op total joint education, either in class or online, in the last 12 months? Yes   Patient attended total joint pre-op class/received pre-op teaching  online   Falls Risk   Has the patient been identified as a high falls risk before surgery? (fallen in the last 6 months, history of falls, unsteady gait, or balance issues) No   Did an order get placed to attend outpatient pre-surgery balance evaluation? No

## 2025-06-17 ENCOUNTER — PREP FOR PROCEDURE (OUTPATIENT)
Dept: OTHER | Facility: CLINIC | Age: 45
End: 2025-06-17
Payer: COMMERCIAL

## 2025-06-17 NOTE — PROGRESS NOTES
SURGERY PLAN (PRE-OP PLAN)     Patient Position (indicated by x):  X  Lateral decubitus, Wixson hip positioner   x  Regular OR table     Romero catheter   X  Revision LEESA drape with plastic side bags for leg   X  Blue U drape x2   Blue and white stockinet   Coban   Ioban      LEESA Requests (indicated by x):    Echo BiMetric stems   X  Biomet TAPERLOC ingrowth stem      Biomet Integral cemented stem     Biomet RB cup, 32 heads   X  G7 cup      Biomet Bipolar cup      Specimens and cultures (indicated by x):      Tissue cultures, aerobic and anaerobic without gram stain      Frozen section      pathology specimens - fresh      pathology specimens - formalin      Background: AVN right hip    Plan: Primary RIGHT LEESA with Dayday/Biomet    Ancef/TXA pre-op    Gibson Ware Md  Adult Joint Reconstruction Fellow  Dept Orthopaedic Surgery, [facility]

## 2025-06-18 RX ORDER — ACETAMINOPHEN 500 MG
2000 TABLET ORAL 2 TIMES DAILY PRN
Status: ON HOLD | COMMUNITY
End: 2025-06-25

## 2025-06-18 RX ORDER — LORATADINE 10 MG/1
10 TABLET ORAL DAILY
COMMUNITY

## 2025-06-18 NOTE — PHARMACY-ADMISSION MEDICATION HISTORY
Pharmacy Intern Pre-operative Admission Medication History    Admission medication history was completed on June 18, 2025 in anticipation for upcoming surgical admission currently scheduled for 6/24/25. The information provided in this note is only as accurate as the sources available at the time of the update.  Pre-operative nursing staff should still review this list with patient to add last dose information as well as any changes or updates.     Information Source(s):   Patient via phone  Dispense report    Pertinent Information:  Hold the dose 3 days prior to surgery, which would be this Friday, 6/20.  Used to take 4 tablets of 500 mg Tylenol BID. He reported that his provider told him he can take it with celecoxib. He mentioned that over the past month, the pain has not improved, but become able to tolerate it.    Changes made to PTA medication list:  Added:   acetaminophen (TYLENOL) 500 MG tablet   loratadine (CLARITIN) 10 MG tablet   Deleted: None  Changed: None    Medication History Completed By: Arpit Mark 6/18/2025 1:38 PM    PTA Med List   Medication Sig Last Dose/Taking    acetaminophen (TYLENOL) 500 MG tablet Take 2,000 mg by mouth 2 times daily as needed for mild pain. More than a month    celecoxib (CELEBREX) 200 MG capsule Take 200 mg by mouth 2 times daily Taking    GENVOYA [id number] MG PO TABS Take 1 tablet by mouth every morning Taking    loratadine (CLARITIN) 10 MG tablet Take 10 mg by mouth daily. Taking

## 2025-06-24 ENCOUNTER — APPOINTMENT (OUTPATIENT)
Dept: GENERAL RADIOLOGY | Facility: CLINIC | Age: 45
End: 2025-06-24
Attending: ORTHOPAEDIC SURGERY
Payer: COMMERCIAL

## 2025-06-24 ENCOUNTER — ANESTHESIA (OUTPATIENT)
Dept: SURGERY | Facility: CLINIC | Age: 45
End: 2025-06-24
Payer: COMMERCIAL

## 2025-06-24 ENCOUNTER — HOSPITAL ENCOUNTER (OUTPATIENT)
Facility: CLINIC | Age: 45
LOS: 1 days | Discharge: HOME | End: 2025-06-25
Attending: ORTHOPAEDIC SURGERY | Admitting: ORTHOPAEDIC SURGERY
Payer: COMMERCIAL

## 2025-06-24 DIAGNOSIS — M87.9 OSTEONECROSIS OF BOTH HIPS (H): ICD-10-CM

## 2025-06-24 DIAGNOSIS — Z96.641 STATUS POST TOTAL HIP REPLACEMENT, RIGHT: ICD-10-CM

## 2025-06-24 DIAGNOSIS — Z96.641 S/P TOTAL RIGHT HIP ARTHROPLASTY: Primary | ICD-10-CM

## 2025-06-24 LAB — GLUCOSE BLDC GLUCOMTR-MCNC: 104 MG/DL (ref 70–99)

## 2025-06-24 PROCEDURE — C1776 JOINT DEVICE (IMPLANTABLE): HCPCS | Performed by: ORTHOPAEDIC SURGERY

## 2025-06-24 PROCEDURE — 250N000013 HC RX MED GY IP 250 OP 250 PS 637

## 2025-06-24 PROCEDURE — 258N000003 HC RX IP 258 OP 636

## 2025-06-24 PROCEDURE — 250N000011 HC RX IP 250 OP 636

## 2025-06-24 PROCEDURE — 710N000010 HC RECOVERY PHASE 1, LEVEL 2, PER MIN: Performed by: ORTHOPAEDIC SURGERY

## 2025-06-24 PROCEDURE — 999N000141 HC STATISTIC PRE-PROCEDURE NURSING ASSESSMENT: Performed by: ORTHOPAEDIC SURGERY

## 2025-06-24 PROCEDURE — 250N000009 HC RX 250: Performed by: NURSE ANESTHETIST, CERTIFIED REGISTERED

## 2025-06-24 PROCEDURE — 250N000011 HC RX IP 250 OP 636: Performed by: PHYSICIAN ASSISTANT

## 2025-06-24 PROCEDURE — 999N000065 XR PELVIS AND HIP PORTABLE RIGHT 1 VIEW

## 2025-06-24 PROCEDURE — 250N000011 HC RX IP 250 OP 636: Performed by: NURSE ANESTHETIST, CERTIFIED REGISTERED

## 2025-06-24 PROCEDURE — 250N000013 HC RX MED GY IP 250 OP 250 PS 637: Performed by: PHYSICIAN ASSISTANT

## 2025-06-24 PROCEDURE — 258N000003 HC RX IP 258 OP 636: Performed by: ORTHOPAEDIC SURGERY

## 2025-06-24 PROCEDURE — 250N000011 HC RX IP 250 OP 636: Performed by: ORTHOPAEDIC SURGERY

## 2025-06-24 PROCEDURE — 99204 OFFICE O/P NEW MOD 45 MIN: CPT | Performed by: PHYSICIAN ASSISTANT

## 2025-06-24 PROCEDURE — 360N000077 HC SURGERY LEVEL 4, PER MIN: Performed by: ORTHOPAEDIC SURGERY

## 2025-06-24 PROCEDURE — 272N000001 HC OR GENERAL SUPPLY STERILE: Performed by: ORTHOPAEDIC SURGERY

## 2025-06-24 PROCEDURE — 250N000009 HC RX 250: Performed by: ORTHOPAEDIC SURGERY

## 2025-06-24 PROCEDURE — 370N000017 HC ANESTHESIA TECHNICAL FEE, PER MIN: Performed by: ORTHOPAEDIC SURGERY

## 2025-06-24 PROCEDURE — 27130 TOTAL HIP ARTHROPLASTY: CPT | Mod: RT | Performed by: ORTHOPAEDIC SURGERY

## 2025-06-24 PROCEDURE — 250N000011 HC RX IP 250 OP 636: Performed by: STUDENT IN AN ORGANIZED HEALTH CARE EDUCATION/TRAINING PROGRAM

## 2025-06-24 PROCEDURE — 258N000003 HC RX IP 258 OP 636: Performed by: NURSE ANESTHETIST, CERTIFIED REGISTERED

## 2025-06-24 DEVICE — IMPLANTABLE DEVICE
Type: IMPLANTABLE DEVICE | Site: HIP | Status: FUNCTIONAL
Brand: G7® ACETABULAR SYSTEM

## 2025-06-24 DEVICE — IMPLANTABLE DEVICE
Type: IMPLANTABLE DEVICE | Site: HIP | Status: FUNCTIONAL
Brand: G7® VIVACIT-E®

## 2025-06-24 DEVICE — IMPLANTABLE DEVICE
Type: IMPLANTABLE DEVICE | Site: HIP | Status: FUNCTIONAL
Brand: TAPERLOC COMPLETE PRIMARY FEMORAL

## 2025-06-24 DEVICE — IMPLANTABLE DEVICE
Type: IMPLANTABLE DEVICE | Site: HIP | Status: FUNCTIONAL
Brand: BIOLOX® DELTA MODULAR CERAMIC HEAD

## 2025-06-24 RX ORDER — HYDROMORPHONE HYDROCHLORIDE 1 MG/ML
0.2 INJECTION, SOLUTION INTRAMUSCULAR; INTRAVENOUS; SUBCUTANEOUS EVERY 5 MIN PRN
Status: DISCONTINUED | OUTPATIENT
Start: 2025-06-24 | End: 2025-06-24 | Stop reason: HOSPADM

## 2025-06-24 RX ORDER — TRANEXAMIC ACID 650 MG/1
1950 TABLET ORAL ONCE
Status: COMPLETED | OUTPATIENT
Start: 2025-06-24 | End: 2025-06-24

## 2025-06-24 RX ORDER — FENTANYL CITRATE 50 UG/ML
INJECTION, SOLUTION INTRAMUSCULAR; INTRAVENOUS PRN
Status: DISCONTINUED | OUTPATIENT
Start: 2025-06-24 | End: 2025-06-24

## 2025-06-24 RX ORDER — CELECOXIB 200 MG/1
400 CAPSULE ORAL ONCE
Status: COMPLETED | OUTPATIENT
Start: 2025-06-24 | End: 2025-06-24

## 2025-06-24 RX ORDER — OXYCODONE HYDROCHLORIDE 5 MG/1
5 TABLET ORAL EVERY 4 HOURS PRN
Status: DISCONTINUED | OUTPATIENT
Start: 2025-06-24 | End: 2025-06-25 | Stop reason: HOSPADM

## 2025-06-24 RX ORDER — ACETAMINOPHEN 325 MG/1
975 TABLET ORAL EVERY 8 HOURS
Status: DISCONTINUED | OUTPATIENT
Start: 2025-06-24 | End: 2025-06-25 | Stop reason: HOSPADM

## 2025-06-24 RX ORDER — ONDANSETRON 4 MG/1
4 TABLET, ORALLY DISINTEGRATING ORAL EVERY 30 MIN PRN
Status: DISCONTINUED | OUTPATIENT
Start: 2025-06-24 | End: 2025-06-24 | Stop reason: HOSPADM

## 2025-06-24 RX ORDER — ASPIRIN 81 MG/1
81 TABLET ORAL 2 TIMES DAILY
Status: DISCONTINUED | OUTPATIENT
Start: 2025-06-25 | End: 2025-06-25 | Stop reason: HOSPADM

## 2025-06-24 RX ORDER — PROPOFOL 10 MG/ML
INJECTION, EMULSION INTRAVENOUS PRN
Status: DISCONTINUED | OUTPATIENT
Start: 2025-06-24 | End: 2025-06-24

## 2025-06-24 RX ORDER — BISACODYL 10 MG
10 SUPPOSITORY, RECTAL RECTAL DAILY PRN
Status: DISCONTINUED | OUTPATIENT
Start: 2025-06-24 | End: 2025-06-25 | Stop reason: HOSPADM

## 2025-06-24 RX ORDER — HYDROXYZINE HYDROCHLORIDE 25 MG/1
25 TABLET, FILM COATED ORAL EVERY 6 HOURS PRN
Status: DISCONTINUED | OUTPATIENT
Start: 2025-06-24 | End: 2025-06-25 | Stop reason: HOSPADM

## 2025-06-24 RX ORDER — ONDANSETRON 2 MG/ML
4 INJECTION INTRAMUSCULAR; INTRAVENOUS EVERY 30 MIN PRN
Status: DISCONTINUED | OUTPATIENT
Start: 2025-06-24 | End: 2025-06-24 | Stop reason: HOSPADM

## 2025-06-24 RX ORDER — HYDROMORPHONE HYDROCHLORIDE 1 MG/ML
0.4 INJECTION, SOLUTION INTRAMUSCULAR; INTRAVENOUS; SUBCUTANEOUS
Status: DISCONTINUED | OUTPATIENT
Start: 2025-06-24 | End: 2025-06-25 | Stop reason: HOSPADM

## 2025-06-24 RX ORDER — ACETAMINOPHEN 325 MG/1
975 TABLET ORAL ONCE
Status: COMPLETED | OUTPATIENT
Start: 2025-06-24 | End: 2025-06-24

## 2025-06-24 RX ORDER — SODIUM CHLORIDE, SODIUM LACTATE, POTASSIUM CHLORIDE, CALCIUM CHLORIDE 600; 310; 30; 20 MG/100ML; MG/100ML; MG/100ML; MG/100ML
INJECTION, SOLUTION INTRAVENOUS CONTINUOUS PRN
Status: DISCONTINUED | OUTPATIENT
Start: 2025-06-24 | End: 2025-06-24

## 2025-06-24 RX ORDER — NALOXONE HYDROCHLORIDE 0.4 MG/ML
0.4 INJECTION, SOLUTION INTRAMUSCULAR; INTRAVENOUS; SUBCUTANEOUS
Status: DISCONTINUED | OUTPATIENT
Start: 2025-06-24 | End: 2025-06-25 | Stop reason: HOSPADM

## 2025-06-24 RX ORDER — DEXAMETHASONE SODIUM PHOSPHATE 4 MG/ML
INJECTION, SOLUTION INTRA-ARTICULAR; INTRALESIONAL; INTRAMUSCULAR; INTRAVENOUS; SOFT TISSUE PRN
Status: DISCONTINUED | OUTPATIENT
Start: 2025-06-24 | End: 2025-06-24

## 2025-06-24 RX ORDER — AMOXICILLIN 250 MG
1 CAPSULE ORAL 2 TIMES DAILY
Status: DISCONTINUED | OUTPATIENT
Start: 2025-06-24 | End: 2025-06-25 | Stop reason: HOSPADM

## 2025-06-24 RX ORDER — CEFAZOLIN SODIUM/WATER 2 G/20 ML
2 SYRINGE (ML) INTRAVENOUS SEE ADMIN INSTRUCTIONS
Status: DISCONTINUED | OUTPATIENT
Start: 2025-06-24 | End: 2025-06-24 | Stop reason: HOSPADM

## 2025-06-24 RX ORDER — NALOXONE HYDROCHLORIDE 0.4 MG/ML
0.1 INJECTION, SOLUTION INTRAMUSCULAR; INTRAVENOUS; SUBCUTANEOUS
Status: DISCONTINUED | OUTPATIENT
Start: 2025-06-24 | End: 2025-06-24 | Stop reason: HOSPADM

## 2025-06-24 RX ORDER — POLYETHYLENE GLYCOL 3350 17 G/17G
1 POWDER, FOR SOLUTION ORAL DAILY
Qty: 7 PACKET | Refills: 0 | Status: SHIPPED | OUTPATIENT
Start: 2025-06-24

## 2025-06-24 RX ORDER — AMOXICILLIN 250 MG
1-2 CAPSULE ORAL 2 TIMES DAILY
Qty: 30 TABLET | Refills: 0 | Status: SHIPPED | OUTPATIENT
Start: 2025-06-24

## 2025-06-24 RX ORDER — NALOXONE HYDROCHLORIDE 0.4 MG/ML
0.2 INJECTION, SOLUTION INTRAMUSCULAR; INTRAVENOUS; SUBCUTANEOUS
Status: DISCONTINUED | OUTPATIENT
Start: 2025-06-24 | End: 2025-06-25 | Stop reason: HOSPADM

## 2025-06-24 RX ORDER — ONDANSETRON 2 MG/ML
4 INJECTION INTRAMUSCULAR; INTRAVENOUS EVERY 6 HOURS PRN
Status: DISCONTINUED | OUTPATIENT
Start: 2025-06-24 | End: 2025-06-25 | Stop reason: HOSPADM

## 2025-06-24 RX ORDER — SODIUM CHLORIDE, SODIUM LACTATE, POTASSIUM CHLORIDE, CALCIUM CHLORIDE 600; 310; 30; 20 MG/100ML; MG/100ML; MG/100ML; MG/100ML
INJECTION, SOLUTION INTRAVENOUS CONTINUOUS
Status: DISCONTINUED | OUTPATIENT
Start: 2025-06-24 | End: 2025-06-25

## 2025-06-24 RX ORDER — OXYCODONE HYDROCHLORIDE 10 MG/1
10 TABLET ORAL EVERY 4 HOURS PRN
Status: DISCONTINUED | OUTPATIENT
Start: 2025-06-24 | End: 2025-06-25 | Stop reason: HOSPADM

## 2025-06-24 RX ORDER — DEXAMETHASONE SODIUM PHOSPHATE 4 MG/ML
4 INJECTION, SOLUTION INTRA-ARTICULAR; INTRALESIONAL; INTRAMUSCULAR; INTRAVENOUS; SOFT TISSUE
Status: DISCONTINUED | OUTPATIENT
Start: 2025-06-24 | End: 2025-06-24 | Stop reason: HOSPADM

## 2025-06-24 RX ORDER — PROPOFOL 10 MG/ML
INJECTION, EMULSION INTRAVENOUS CONTINUOUS PRN
Status: DISCONTINUED | OUTPATIENT
Start: 2025-06-24 | End: 2025-06-24

## 2025-06-24 RX ORDER — SODIUM CHLORIDE, SODIUM LACTATE, POTASSIUM CHLORIDE, CALCIUM CHLORIDE 600; 310; 30; 20 MG/100ML; MG/100ML; MG/100ML; MG/100ML
INJECTION, SOLUTION INTRAVENOUS CONTINUOUS
Status: DISCONTINUED | OUTPATIENT
Start: 2025-06-24 | End: 2025-06-24 | Stop reason: HOSPADM

## 2025-06-24 RX ORDER — POLYETHYLENE GLYCOL 3350 17 G/17G
17 POWDER, FOR SOLUTION ORAL DAILY
Status: DISCONTINUED | OUTPATIENT
Start: 2025-06-25 | End: 2025-06-25 | Stop reason: HOSPADM

## 2025-06-24 RX ORDER — ONDANSETRON 4 MG/1
4 TABLET, ORALLY DISINTEGRATING ORAL EVERY 6 HOURS PRN
Status: DISCONTINUED | OUTPATIENT
Start: 2025-06-24 | End: 2025-06-25 | Stop reason: HOSPADM

## 2025-06-24 RX ORDER — HYDROMORPHONE HYDROCHLORIDE 1 MG/ML
0.4 INJECTION, SOLUTION INTRAMUSCULAR; INTRAVENOUS; SUBCUTANEOUS EVERY 5 MIN PRN
Status: DISCONTINUED | OUTPATIENT
Start: 2025-06-24 | End: 2025-06-24 | Stop reason: HOSPADM

## 2025-06-24 RX ORDER — CEFAZOLIN SODIUM 2 G/50ML
2 SOLUTION INTRAVENOUS EVERY 8 HOURS
Status: COMPLETED | OUTPATIENT
Start: 2025-06-24 | End: 2025-06-25

## 2025-06-24 RX ORDER — MAGNESIUM HYDROXIDE 1200 MG/15ML
LIQUID ORAL PRN
Status: DISCONTINUED | OUTPATIENT
Start: 2025-06-24 | End: 2025-06-24 | Stop reason: HOSPADM

## 2025-06-24 RX ORDER — PROCHLORPERAZINE MALEATE 5 MG/1
10 TABLET ORAL EVERY 6 HOURS PRN
Status: DISCONTINUED | OUTPATIENT
Start: 2025-06-24 | End: 2025-06-25 | Stop reason: HOSPADM

## 2025-06-24 RX ORDER — FENTANYL CITRATE 50 UG/ML
25 INJECTION, SOLUTION INTRAMUSCULAR; INTRAVENOUS EVERY 5 MIN PRN
Status: DISCONTINUED | OUTPATIENT
Start: 2025-06-24 | End: 2025-06-24 | Stop reason: HOSPADM

## 2025-06-24 RX ORDER — LIDOCAINE HYDROCHLORIDE 20 MG/ML
INJECTION, SOLUTION INFILTRATION; PERINEURAL PRN
Status: DISCONTINUED | OUTPATIENT
Start: 2025-06-24 | End: 2025-06-24

## 2025-06-24 RX ORDER — METHOCARBAMOL 750 MG/1
750 TABLET, FILM COATED ORAL EVERY 6 HOURS PRN
Status: DISCONTINUED | OUTPATIENT
Start: 2025-06-24 | End: 2025-06-25 | Stop reason: HOSPADM

## 2025-06-24 RX ORDER — ACETAMINOPHEN 325 MG/1
650 TABLET ORAL EVERY 4 HOURS PRN
Qty: 100 TABLET | Refills: 0 | Status: SHIPPED | OUTPATIENT
Start: 2025-06-24

## 2025-06-24 RX ORDER — FENTANYL CITRATE 50 UG/ML
50 INJECTION, SOLUTION INTRAMUSCULAR; INTRAVENOUS EVERY 5 MIN PRN
Status: DISCONTINUED | OUTPATIENT
Start: 2025-06-24 | End: 2025-06-24 | Stop reason: HOSPADM

## 2025-06-24 RX ORDER — SENNOSIDES 8.6 MG
325 CAPSULE ORAL 2 TIMES DAILY
Status: DISCONTINUED | OUTPATIENT
Start: 2025-06-25 | End: 2025-06-24

## 2025-06-24 RX ORDER — CEFAZOLIN SODIUM/WATER 2 G/20 ML
2 SYRINGE (ML) INTRAVENOUS
Status: COMPLETED | OUTPATIENT
Start: 2025-06-24 | End: 2025-06-24

## 2025-06-24 RX ORDER — ONDANSETRON 2 MG/ML
INJECTION INTRAMUSCULAR; INTRAVENOUS PRN
Status: DISCONTINUED | OUTPATIENT
Start: 2025-06-24 | End: 2025-06-24

## 2025-06-24 RX ORDER — HYDROMORPHONE HYDROCHLORIDE 1 MG/ML
0.2 INJECTION, SOLUTION INTRAMUSCULAR; INTRAVENOUS; SUBCUTANEOUS
Status: DISCONTINUED | OUTPATIENT
Start: 2025-06-24 | End: 2025-06-25 | Stop reason: HOSPADM

## 2025-06-24 RX ORDER — LIDOCAINE 40 MG/G
CREAM TOPICAL
Status: DISCONTINUED | OUTPATIENT
Start: 2025-06-24 | End: 2025-06-25 | Stop reason: HOSPADM

## 2025-06-24 RX ORDER — SENNOSIDES 8.6 MG
325 CAPSULE ORAL 2 TIMES DAILY
Qty: 60 TABLET | Refills: 0 | Status: SHIPPED | OUTPATIENT
Start: 2025-06-24

## 2025-06-24 RX ADMIN — PHENYLEPHRINE HYDROCHLORIDE 100 MCG: 10 INJECTION INTRAVENOUS at 12:59

## 2025-06-24 RX ADMIN — PROPOFOL 30 MG: 10 INJECTION, EMULSION INTRAVENOUS at 14:32

## 2025-06-24 RX ADMIN — HYDROXYZINE HYDROCHLORIDE 25 MG: 25 TABLET ORAL at 17:26

## 2025-06-24 RX ADMIN — OXYCODONE HYDROCHLORIDE 10 MG: 10 TABLET ORAL at 23:03

## 2025-06-24 RX ADMIN — MIDAZOLAM 2 MG: 1 INJECTION INTRAMUSCULAR; INTRAVENOUS at 12:33

## 2025-06-24 RX ADMIN — ONDANSETRON 4 MG: 2 INJECTION INTRAMUSCULAR; INTRAVENOUS at 14:29

## 2025-06-24 RX ADMIN — METHOCARBAMOL 750 MG: 750 TABLET ORAL at 20:26

## 2025-06-24 RX ADMIN — SODIUM CHLORIDE, SODIUM LACTATE, POTASSIUM CHLORIDE, AND CALCIUM CHLORIDE: .6; .31; .03; .02 INJECTION, SOLUTION INTRAVENOUS at 12:25

## 2025-06-24 RX ADMIN — HYDROMORPHONE HYDROCHLORIDE 0.4 MG: 1 INJECTION, SOLUTION INTRAMUSCULAR; INTRAVENOUS; SUBCUTANEOUS at 20:46

## 2025-06-24 RX ADMIN — Medication 2 G: at 12:35

## 2025-06-24 RX ADMIN — HYDROMORPHONE HYDROCHLORIDE 0.2 MG: 1 INJECTION, SOLUTION INTRAMUSCULAR; INTRAVENOUS; SUBCUTANEOUS at 16:57

## 2025-06-24 RX ADMIN — FENTANYL CITRATE 50 MCG: 50 INJECTION INTRAMUSCULAR; INTRAVENOUS at 12:33

## 2025-06-24 RX ADMIN — ACETAMINOPHEN 975 MG: 325 TABLET ORAL at 18:39

## 2025-06-24 RX ADMIN — HYDROMORPHONE HYDROCHLORIDE 0.2 MG: 1 INJECTION, SOLUTION INTRAMUSCULAR; INTRAVENOUS; SUBCUTANEOUS at 17:16

## 2025-06-24 RX ADMIN — ACETAMINOPHEN 975 MG: 325 TABLET ORAL at 10:01

## 2025-06-24 RX ADMIN — SODIUM CHLORIDE, SODIUM LACTATE, POTASSIUM CHLORIDE, AND CALCIUM CHLORIDE: .6; .31; .03; .02 INJECTION, SOLUTION INTRAVENOUS at 20:27

## 2025-06-24 RX ADMIN — HYDROMORPHONE HYDROCHLORIDE 0.4 MG: 1 INJECTION, SOLUTION INTRAMUSCULAR; INTRAVENOUS; SUBCUTANEOUS at 17:08

## 2025-06-24 RX ADMIN — FENTANYL CITRATE 50 MCG: 0.05 INJECTION, SOLUTION INTRAMUSCULAR; INTRAVENOUS at 16:15

## 2025-06-24 RX ADMIN — DOCUSATE SODIUM AND SENNOSIDES 1 TABLET: 8.6; 5 TABLET, FILM COATED ORAL at 20:26

## 2025-06-24 RX ADMIN — PHENYLEPHRINE HYDROCHLORIDE 100 MCG: 10 INJECTION INTRAVENOUS at 13:22

## 2025-06-24 RX ADMIN — OXYCODONE HYDROCHLORIDE 10 MG: 10 TABLET ORAL at 18:41

## 2025-06-24 RX ADMIN — LIDOCAINE HYDROCHLORIDE 40 MG: 20 INJECTION, SOLUTION INFILTRATION; PERINEURAL at 12:45

## 2025-06-24 RX ADMIN — PHENYLEPHRINE HYDROCHLORIDE 0.2 MCG/KG/MIN: 10 INJECTION INTRAVENOUS at 13:34

## 2025-06-24 RX ADMIN — FENTANYL CITRATE 50 MCG: 0.05 INJECTION, SOLUTION INTRAMUSCULAR; INTRAVENOUS at 16:04

## 2025-06-24 RX ADMIN — HYDROMORPHONE HYDROCHLORIDE 0.2 MG: 1 INJECTION, SOLUTION INTRAMUSCULAR; INTRAVENOUS; SUBCUTANEOUS at 16:39

## 2025-06-24 RX ADMIN — PROPOFOL 50 MCG/KG/MIN: 10 INJECTION, EMULSION INTRAVENOUS at 12:45

## 2025-06-24 RX ADMIN — TRANEXAMIC ACID 1950 MG: 650 TABLET ORAL at 10:01

## 2025-06-24 RX ADMIN — CEFAZOLIN SODIUM 2 G: 2 SOLUTION INTRAVENOUS at 21:21

## 2025-06-24 RX ADMIN — Medication 1500 MG: at 13:12

## 2025-06-24 RX ADMIN — PHENYLEPHRINE HYDROCHLORIDE 100 MCG: 10 INJECTION INTRAVENOUS at 13:14

## 2025-06-24 RX ADMIN — CELECOXIB 400 MG: 200 CAPSULE ORAL at 10:01

## 2025-06-24 RX ADMIN — SODIUM CHLORIDE, SODIUM LACTATE, POTASSIUM CHLORIDE, AND CALCIUM CHLORIDE: .6; .31; .03; .02 INJECTION, SOLUTION INTRAVENOUS at 14:05

## 2025-06-24 RX ADMIN — PHENYLEPHRINE HYDROCHLORIDE 100 MCG: 10 INJECTION INTRAVENOUS at 13:31

## 2025-06-24 RX ADMIN — DEXAMETHASONE SODIUM PHOSPHATE 6 MG: 4 INJECTION, SOLUTION INTRAMUSCULAR; INTRAVENOUS at 13:01

## 2025-06-24 RX ADMIN — PROPOFOL 20 MG: 10 INJECTION, EMULSION INTRAVENOUS at 14:46

## 2025-06-24 RX ADMIN — PROPOFOL 40 MG: 10 INJECTION, EMULSION INTRAVENOUS at 13:57

## 2025-06-24 ASSESSMENT — COLUMBIA-SUICIDE SEVERITY RATING SCALE - C-SSRS
2. HAVE YOU ACTUALLY HAD ANY THOUGHTS OF KILLING YOURSELF IN THE PAST MONTH?: NO
6. HAVE YOU EVER DONE ANYTHING, STARTED TO DO ANYTHING, OR PREPARED TO DO ANYTHING TO END YOUR LIFE?: NO
1. IN THE PAST MONTH, HAVE YOU WISHED YOU WERE DEAD OR WISHED YOU COULD GO TO SLEEP AND NOT WAKE UP?: NO
2. HAVE YOU ACTUALLY HAD ANY THOUGHTS OF KILLING YOURSELF SINCE LAST CONTACT?: NO
6. HAVE YOU EVER DONE ANYTHING, STARTED TO DO ANYTHING, OR PREPARED TO DO ANYTHING TO END YOUR LIFE?: NO

## 2025-06-24 ASSESSMENT — ACTIVITIES OF DAILY LIVING (ADL)
ADLS_ACUITY_SCORE: 21
ADLS_ACUITY_SCORE: 28
ADLS_ACUITY_SCORE: 28
ADLS_ACUITY_SCORE: 26
ADLS_ACUITY_SCORE: 21
ADLS_ACUITY_SCORE: 17
ADLS_ACUITY_SCORE: 26
ADLS_ACUITY_SCORE: 26
ADLS_ACUITY_SCORE: 21
ADLS_ACUITY_SCORE: 28
ADLS_ACUITY_SCORE: 26
ADLS_ACUITY_SCORE: 21

## 2025-06-24 ASSESSMENT — ENCOUNTER SYMPTOMS
SEIZURES: 0
DYSRHYTHMIAS: 0

## 2025-06-24 ASSESSMENT — LIFESTYLE VARIABLES: TOBACCO_USE: 0

## 2025-06-24 NOTE — PROGRESS NOTES
Called Infection Control at [place] regarding isolation status.; page returned and pt no longer needs isolation precautions since being cleared in 2021

## 2025-06-24 NOTE — ANESTHESIA CARE TRANSFER NOTE
Patient: Neil Pompa    Procedure: Procedure(s):  ARTHROPLASTY, HIP, TOTAL, POSTERIOR APPROACH       Diagnosis: Other secondary osteoarthritis of right hip [M16.7]  Diagnosis Additional Information: No value filed.    Anesthesia Type:   Spinal     Note:    Oropharynx: oropharynx clear of all foreign objects  Level of Consciousness: drowsy  Oxygen Supplementation: room air    Independent Airway: airway patency satisfactory and stable  Dentition: dentition unchanged  Vital Signs Stable: post-procedure vital signs reviewed and stable  Report to RN Given: handoff report given  Patient transferred to: PACU    Handoff Report: Identifed the Patient, Identified the Reponsible Provider, Reviewed the pertinent medical history, Discussed the surgical course, Reviewed Intra-OP anesthesia mangement and issues during anesthesia, Set expectations for post-procedure period and Allowed opportunity for questions and acknowledgement of understanding      Vitals:  Vitals Value Taken Time   /73 06/24/25 15:15   Temp 36.3 AX    Pulse 77 06/24/25 15:22   Resp 16 06/24/25 15:22   SpO2 95 % 06/24/25 15:22   Vitals shown include unfiled device data.    Electronically Signed By: PRATIK Alexandra CRNA  June 24, 2025  3:23 PM
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
## 2025-06-24 NOTE — BRIEF OP NOTE
[facility]    Brief Operative Note    Pre-operative diagnosis: Other secondary osteoarthritis of right hip [M16.7]  Post-operative diagnosis Same as pre-operative diagnosis    Procedure: ARTHROPLASTY, HIP, TOTAL, POSTERIOR APPROACH, Right - Hip    Surgeon: Surgeons and Role:     * Kenyon Ayala MD - Primary     * Isabelle Clifford PA-C - Assisting     * Mandeep Caban MD - Resident - Assisting  Anesthesia: Choice   Estimated Blood Loss: 300 ml    Drains: None  Specimens: * No specimens in log *  Findings:   Loss of femoral head sphericity with subchondral osteonecrosis .  Complications: None apparent.  Implants:   Implant Name Type Inv. Item Serial No.  Lot No. LRB No. Used Action   IMP SHELL BIOM G7 ACETAB PPS LINN HOLE 56MM SZ F [id number] - [id number] Total Joint Component/Insert IMP SHELL BIOM G7 ACETAB PPS LINN HOLE 56MM SZ F [id number]  [employer] [id number] Right 1 Implanted   LINER ACTB G7 F 36MM VIVACIT-E HIP STRL LUM LF [id number] - [id number] Total Joint Component/Insert LINER ACTB G7 F 36MM VIVACIT-E HIP STRL LUM LF [id number]  [employer] [id number] Right 1 Implanted   IMP STEM FEM BIOM TAPERLOC STD OFFSET TYPE 1 [facility] -110 - [id number] Total Joint Component/Insert IMP STEM FEM BIOM TAPERLOC STD OFFSET TYPE 1 [facility] -110  [employer] [id number] Right 1 Implanted   HEAD FEM 0MM OFST 36MM HIP ACTB MDLR TY 1 BLX D G7 - [id number] Total Joint Component/Insert HEAD FEM 0MM OFST 36MM HIP ACTB MDLR TY 1 BLX D G7  [employer] [id number] Right 1 Implanted       Assessment: Neil Pompa is a 44 year old male with right hip osteonecrosis now s/p right total hip arthroplasty on 06/24/25 with Dr. Ayala.    Activity: Up with assist until independent, posterior hip precautions - no flexion past 90, no adduction past midline, no internal rotation past neutral  -Mobilize as early as possible:  -Should be at least up in a chair and to the  commode with assist this evening.  -Up to chair for meals and during daylight hours.  -Ambulating about floor tomorrow and thereafter with assistive device.  Delirium precautions: Shades up during the day, quiet at night with minimal distractions, routine orientation. Remove tethers as soon as possible.  Weight bearing status: WBAT.  Pain management: Multimodal pain management with scheduled tylenol, NSAID, low dose oral narcotic, IV narcotic for breakthrough  Antibiotics: Ancef/vanco x 24 hours  Brace: Hip abduction pillow at night/while sleeping  Diet: Begin with clear fluids and progress diet as tolerated.   DVT prophylaxis: ASA 162mg daily x 4 weeks starting POD1  Imaging: AP pelvis and cross table lateral in PACU.  Labs: Monitor Hgb POD 1,2  Dressings: Keep clean, dry and intact until follow-up  Physical Therapy/Occupational Therapy: PT for mobilization, ADLs, fall prevention, gait training, assistive devices.  Consults: IM, SW, PT, OT  Follow-up: Clinic 4 weeks with Dr. Ayala team (see below for scheduled orthopaedic and PT/OT follow-up)  Disposition: Pending pain control, PT, medical stability, likely discharge to home POD1-2    Future Appointments 6/24/2025 - 12/21/2025        Date Visit Type Length Department Provider     6/25/2025 10:00 AM IP PT EVALUATION 45 min [facility] PT Carlos Brown, PT    Location Instructions:     The [facility] is located in the [facility]. lt is easily accessible from virtually any point in the [facility] area, via [address]              6/25/2025 10:45 AM IP OT EVALUATION 45 min [facility] OT Yvette Tipton, OTR    Location Instructions:     This is an inpatient department located at [facility].&nbsp; The [facility] is located in the [facility]. lt is easily accessible from virtually any point in the [facility] area, via  [address]              7/1/2025 12:00 PM EXTREMITY EVALUATION 40 min [facility] Kayla Santos, PT    Location Instructions:     How to find our clinic: We are located in the [facility] on the second floor.&nbsp; As you face the main entrance, our entrance is on the right side of the building (west side of the building) take the elevator to the second floor.&nbsp;   Parking: Free parking is available in the surface lots  Questions or to Reschedule: Contact our clinic: [phone].               7/3/2025 12:00 PM EXTREMITY TREATMENT  40 min [facility] Kayla Santos, PT    Location Instructions:     How to find our clinic: We are located in the [facility] on the second floor.&nbsp; As you face the main entrance, our entrance is on the right side of the building (west side of the building) take the elevator to the second floor.&nbsp;   Parking: Free parking is available in the surface lots  Questions or to Reschedule: Contact our clinic: [phone].               7/8/2025 12:00 PM EXTREMITY TREATMENT  40 min [facility] Kayla Santos, PT    Location Instructions:     How to find our clinic: We are located in the [facility] on the second floor.&nbsp; As you face the main entrance, our entrance is on the right side of the building (west side of the building) take the elevator to the second floor.&nbsp;   Parking: Free parking is available in the surface lots  Questions or to Reschedule: Contact our clinic: [phone].               7/10/2025 12:00 PM EXTREMITY TREATMENT  40 min [facility] Kayla Santos, PT    Location Instructions:     How to find our clinic: We are located in the [facility] on the second floor.&nbsp; As you face the main entrance, our entrance is on the right side of the building (west side of the building) take the elevator to the second floor.&nbsp;   Parking: Free parking is available in the surface lots  Questions  or to Reschedule: Contact our clinic: [phone].               7/15/2025 12:00 PM EXTREMITY TREATMENT  40 min [facility]YS THERAP[facility] Kayla Santos PT    Location Instructions:     How to find our clinic: We are located in the [facility] on the second floor.&nbsp; As you face the main entrance, our entrance is on the right side of the building (west side of the building) take the elevator to the second floor.&nbsp;   Parking: Free parking is available in the surface lots  Questions or to Reschedule: Contact our clinic: [phone].               7/21/2025  9:30 AM POST-OP HIP REPLACEMENT 30 min [facility] Trey Winkler, PAKodyC    Location Instructions:     Due to road construction on [address], travel times to this location may be longer than usual. Please plan for extra travel time and check the [place] Department of Transportation [address] project website for delay, closure, and detour information.  The [facility] ([facility]) is in a dense urban area with multiple transportation and parking options. You may wish to review options for  service and self-parking in more detail on the [facility] s website at [email or web address].                   Mandeep Caban MD  Orthopaedic Surgery Resident  Pager: [phone]  06/24/25

## 2025-06-24 NOTE — PLAN OF CARE
"Goal Outcome Evaluation:  Blood pressure 103/76, pulse 67, temperature 97.6  F (36.4  C), temperature source Oral, resp. rate 16, height 1.778 m (5' 10\"), weight 88 kg (194 lb 0.1 oz), SpO2 98%.  5 Ortho Admission Note    Reason for admission: R total R hip arthroplasty  Primary team notified of pt arrival.  Admitted from: PACU  Via: Bed  Accompanied by: Transportation  Belongings: Placed in closet; valuables remain with pt  Admission Required Doc Completed: Yes  Mobility Devices Utilized by Patient Provided (i.e. walker, wheelchair, etc.): None  Teaching: Orientation to unit and call light- call light within reach, use of console, meal times, when to call for the RN, and enforced importance of safety.  IV Access: PIV saline locked patent WNL  Telemetry: No  Ht./Wt.: Completed  Code Status verified on armband: Yes  2 RN Skin Assessment Completed with: Pete BLAKE RN  Suction/Ambu bag/Flowmeter at bedside: Yes    Pt status:    End of shift Summary: See flowsheet for VS and detail assessments.    Changes this Shift:     Pulmonary: On 3 L NC with capno per post surgical protocol. No SOB noted.    Output: Bowel and bladder continent.    Activity: Pt not oob since admission. Pt has hip adductor pillow on.     Skin: Dry clean and intact. Pt has surgical site with dressing on.    Pain: Co pain 8/10 especially during continent care. Managed using PRN PO oxycodone.    Neuro/CMS: A&OX4 Mood is good    Dressing: Derma tavarez and aquacell dry clean and intact    IV/Drains: PIV patent WNL    Plan:        Temp:  [97.4  F (36.3  C)-97.7  F (36.5  C)] 97.6  F (36.4  C)  Pulse:  [56-95] 67  Resp:  [9-20] 16  BP: ()/(64-87) 103/76  SpO2:  [93 %-100 %] 98 %         Problem: Adult Inpatient Plan of Care  Goal: Plan of Care Review  Description: The Plan of Care Review/Shift note should be completed every shift.  The Outcome Evaluation is a brief statement about your assessment that the patient is improving, declining, or no change. " " This information will be displayed automatically on your shift  note.  Outcome: Progressing  Goal: Patient-Specific Goal (Individualized)  Description: You can add care plan individualizations to a care plan. Examples of Individualization might be:  \"Parent requests to be called daily at 9am for status\", \"I have a hard time hearing out of my right ear\", or \"Do not touch me to wake me up as it startles  me\".  Outcome: Progressing  Goal: Absence of Hospital-Acquired Illness or Injury  Outcome: Progressing  Goal: Optimal Comfort and Wellbeing  Outcome: Progressing  Goal: Readiness for Transition of Care  Outcome: Progressing                      "

## 2025-06-24 NOTE — PROGRESS NOTES
PACU to Inpatient Nursing Handoff    Patient Neil Pompa is a 44 year old male who speaks English.   Procedure Procedure(s):  ARTHROPLASTY, HIP, TOTAL, POSTERIOR APPROACH   Surgeon(s) Primary: Kenyon Ayala MD  Assisting: Isabelle Clifford PA-C  Resident - Assisting: Mandeep Caban MD     No Known Allergies    Isolation  No active isolations     Past Medical History   has a past medical history of Anti-cardiolipin antibody positive, Depression, Human immunodeficiency virus (HIV) disease (H), Osteonecrosis (H), Other secondary osteoarthritis of right hip, and Tonsillar hypertrophy.    Anesthesia Spinal   Dermatome Level     Preop Meds acetaminophen (Tylenol) - time given: 1001  celecoxib (Celebrex) - time given: 1001  TXA - time given: 1001   Nerve block Spinal .  Location:bilateral. Med:Not applicable. Time given: 1228   Intraop Meds dexamethasone (Decadron)  fentanyl (Sublimaze): 50 mcg total  ondansetron (Zofran): last given at 1429  Versed 2mg   Local Meds Yes - Local Cocktail (morphine, ropivacaine, epinephrine, Toradol)   Antibiotics cefazolin (Ancef) - last given at 1235  vancomycin (Vancocin) - last given at 1312     Pain Patient Currently in Pain: yes   PACU meds  fentanyl (Sublimaze): 100 mcg (total dose) last given at 1615   hydromorphone (Dilaudid): 1 mg (total dose) last given at 1716 ; Atarax 25mg @1730   PCA / epidural No   Capnography Respiratory Monitoring (EtCO2): 45 mmHg   Telemetry ECG Rhythm: Normal sinus rhythm   Inpatient Telemetry Monitor Ordered? No        Labs Glucose Lab Results   Component Value Date     06/24/2025       Hgb Lab Results   Component Value Date    HGB 13.6 06/03/2025       INR Lab Results   Component Value Date    INR 1.02 12/28/2023      PACU Imaging Completed     Wound/Incision Incision/Surgical Site 08/30/24 Bilateral Hip (Active)   Number of days: 298       Incision/Surgical Site 06/24/25 Right;Lateral Hip (Active)   Incision Assessment UTV 06/24/25 1282    Dressing Alginate;Transparent film (Opsite, Tegaderm) 06/24/25 1520   Closure Approximated;Liquid bandage;Sutures 06/24/25 1645   Incision Drainage Amount None 06/24/25 1645   Dressing Intervention Clean, dry, intact 06/24/25 1645   Number of days: 0      CMS        Equipment abductor pillow   Other LDA       IV Access Peripheral IV 06/24/25 Left;Posterior Hand (Active)   Site Assessment WDL 06/24/25 1520   Line Status Infusing 06/24/25 1520   Dressing Transparent 06/24/25 1520   Dressing Status clean;dry;intact 06/24/25 1520   Dressing Intervention New dressing  06/24/25 1000   Line Intervention Flushed 06/24/25 1000   Line Necessity Yes, meets criteria 06/24/25 1520   Phlebitis Scale 0-->no symptoms 06/24/25 1520   Infiltration? no 06/24/25 1520   Number of days: 0      Blood Products Not applicable  mL   Intake/Output Date 06/24/25 0700 - 06/25/25 0659   Shift 1362-1489 1444-9959 0666-1521 24 Hour Total   INTAKE   I.V. 1000   1000   IV Piggyback 250   250   Shift Total(mL/kg) 1250(14.2)   1250(14.2)   OUTPUT   Shift Total(mL/kg)       Weight (kg) 88 88 88 88      Drains / Romero     Time of void PreOp Time of Void Prior to Procedure: 0830 (06/24/25 0953)    PostOp      Diapered? No   Bladder Scan  880ml @1615; straight cath @1715 1200ml    mL (apple juice) (06/24/25 1619)  tolerating sips, water, and ice chips     Vitals    B/P: 101/81  T: 97.4  F (36.3  C)    Temp src: Axillary  P:  Pulse: 63 (06/24/25 1708)          R: 10  O2:  SpO2: 100 %    O2 Device: Nasal cannula (06/24/25 1600)              Family/support present Cousin - Dinora, Step mom= Emily *ride home tomorrow landline [phone] and cell [phone]   Patient belongings  1 bag   Patient transported on bed   DC meds/scripts (obs/outpt) Not applicable   Inpatient Pain Meds Released? No       Special needs/considerations None   Tasks needing completion Per Dr. Caban note:    Activity: Up with assist until independent, posterior hip  precautions - no flexion past 90, no adduction past midline, no internal rotation past neutral  -Mobilize as early as possible:  -Should be at least up in a chair and to the commode with assist this evening.  -Up to chair for meals and during daylight hours.  -Ambulating about floor tomorrow and thereafter with assistive device.  Delirium precautions: Shades up during the day, quiet at night with minimal distractions, routine orientation. Remove tethers as soon as possible.  Weight bearing status: WBAT.  Pain management: Multimodal pain management with scheduled tylenol, NSAID, low dose oral narcotic, IV narcotic for breakthrough  Antibiotics: Ancef x 24 hours  Brace: Hip abduction pillow at night/while sleeping  Diet: Begin with clear fluids and progress diet as tolerated.   DVT prophylaxis: ASA 325mg BID x 4 weeks starting POD1  Imaging: AP pelvis and cross table lateral in PACU.  Labs: Monitor Hgb POD 1,2  Dressings: Keep clean, dry and intact until follow-up  Physical Therapy/Occupational Therapy: PT for mobilization, ADLs, fall prevention, gait training, assistive devices.  Consults: IM, TAL, PT, OT  Follow-up: Clinic 4 weeks with Dr. Ayala team (see below for scheduled orthopaedic and PT/OT follow-up)  Disposition: Pending pain control, PT, medical stability, likely discharge to home POD1-2          BECKY Byrne

## 2025-06-25 VITALS
OXYGEN SATURATION: 99 % | HEART RATE: 78 BPM | BODY MASS INDEX: 27.77 KG/M2 | DIASTOLIC BLOOD PRESSURE: 71 MMHG | HEIGHT: 70 IN | RESPIRATION RATE: 16 BRPM | SYSTOLIC BLOOD PRESSURE: 116 MMHG | TEMPERATURE: 97.8 F | WEIGHT: 194 LBS

## 2025-06-25 LAB
ALBUMIN SERPL BCG-MCNC: 3.7 G/DL (ref 3.5–5.2)
ALP SERPL-CCNC: 55 U/L (ref 40–150)
ALT SERPL W P-5'-P-CCNC: 21 U/L (ref 0–70)
ANION GAP SERPL CALCULATED.3IONS-SCNC: 10 MMOL/L (ref 7–15)
AST SERPL W P-5'-P-CCNC: 27 U/L (ref 0–45)
BILIRUB DIRECT SERPL-MCNC: 0.11 MG/DL (ref 0–0.3)
BILIRUB SERPL-MCNC: 0.3 MG/DL
BUN SERPL-MCNC: 9.9 MG/DL (ref 6–20)
CALCIUM SERPL-MCNC: 8.6 MG/DL (ref 8.8–10.4)
CHLORIDE SERPL-SCNC: 105 MMOL/L (ref 98–107)
CREAT SERPL-MCNC: 0.99 MG/DL (ref 0.67–1.17)
EGFRCR SERPLBLD CKD-EPI 2021: >90 ML/MIN/1.73M2
ERYTHROCYTE [DISTWIDTH] IN BLOOD BY AUTOMATED COUNT: 12.2 % (ref 10–15)
GLUCOSE SERPL-MCNC: 158 MG/DL (ref 70–99)
HCO3 SERPL-SCNC: 22 MMOL/L (ref 22–29)
HCT VFR BLD AUTO: 33.9 % (ref 40–53)
HGB BLD-MCNC: 11.7 G/DL (ref 13.3–17.7)
MCH RBC QN AUTO: 30 PG (ref 26.5–33)
MCHC RBC AUTO-ENTMCNC: 34.5 G/DL (ref 31.5–36.5)
MCV RBC AUTO: 87 FL (ref 78–100)
PLATELET # BLD AUTO: 140 10E3/UL (ref 150–450)
POTASSIUM SERPL-SCNC: 4.3 MMOL/L (ref 3.4–5.3)
PROT SERPL-MCNC: 6.3 G/DL (ref 6.4–8.3)
RBC # BLD AUTO: 3.9 10E6/UL (ref 4.4–5.9)
SODIUM SERPL-SCNC: 137 MMOL/L (ref 135–145)
WBC # BLD AUTO: 13.9 10E3/UL (ref 4–11)

## 2025-06-25 PROCEDURE — 84155 ASSAY OF PROTEIN SERUM: CPT | Performed by: PHYSICIAN ASSISTANT

## 2025-06-25 PROCEDURE — 85018 HEMOGLOBIN: CPT | Performed by: PHYSICIAN ASSISTANT

## 2025-06-25 PROCEDURE — 250N000013 HC RX MED GY IP 250 OP 250 PS 637

## 2025-06-25 PROCEDURE — 250N000011 HC RX IP 250 OP 636

## 2025-06-25 PROCEDURE — 97165 OT EVAL LOW COMPLEX 30 MIN: CPT | Mod: GO

## 2025-06-25 PROCEDURE — 36415 COLL VENOUS BLD VENIPUNCTURE: CPT | Performed by: PHYSICIAN ASSISTANT

## 2025-06-25 PROCEDURE — 97535 SELF CARE MNGMENT TRAINING: CPT | Mod: GO

## 2025-06-25 PROCEDURE — 97161 PT EVAL LOW COMPLEX 20 MIN: CPT | Mod: GP

## 2025-06-25 PROCEDURE — 93005 ELECTROCARDIOGRAM TRACING: CPT

## 2025-06-25 PROCEDURE — 97116 GAIT TRAINING THERAPY: CPT | Mod: GP

## 2025-06-25 PROCEDURE — 97110 THERAPEUTIC EXERCISES: CPT | Mod: GP

## 2025-06-25 RX ORDER — ASPIRIN 81 MG/1
81 TABLET, COATED ORAL 2 TIMES DAILY
Qty: 60 TABLET | Refills: 0 | Status: SHIPPED | OUTPATIENT
Start: 2025-06-25

## 2025-06-25 RX ORDER — METHOCARBAMOL 500 MG/1
500 TABLET, FILM COATED ORAL EVERY 6 HOURS PRN
Qty: 20 TABLET | Refills: 0 | Status: SHIPPED | OUTPATIENT
Start: 2025-06-25

## 2025-06-25 RX ORDER — OXYCODONE HYDROCHLORIDE 5 MG/1
5-10 TABLET ORAL EVERY 4 HOURS PRN
Qty: 26 TABLET | Refills: 0 | Status: SHIPPED | OUTPATIENT
Start: 2025-06-25

## 2025-06-25 RX ADMIN — ELVITEGRAVIR, COBICISTAT, EMTRICITABINE, AND TENOFOVIR ALAFENAMIDE 1 TABLET: 150; 150; 200; 10 TABLET ORAL at 07:56

## 2025-06-25 RX ADMIN — CEFAZOLIN SODIUM 2 G: 2 SOLUTION INTRAVENOUS at 04:38

## 2025-06-25 RX ADMIN — ACETAMINOPHEN 975 MG: 325 TABLET ORAL at 10:19

## 2025-06-25 RX ADMIN — ACETAMINOPHEN 975 MG: 325 TABLET ORAL at 02:57

## 2025-06-25 RX ADMIN — OXYCODONE HYDROCHLORIDE 5 MG: 5 TABLET ORAL at 07:56

## 2025-06-25 RX ADMIN — DOCUSATE SODIUM AND SENNOSIDES 1 TABLET: 8.6; 5 TABLET, FILM COATED ORAL at 07:55

## 2025-06-25 RX ADMIN — Medication 1500 MG: at 01:20

## 2025-06-25 RX ADMIN — ASPIRIN 81 MG: 81 TABLET, DELAYED RELEASE ORAL at 07:56

## 2025-06-25 RX ADMIN — METHOCARBAMOL 750 MG: 750 TABLET ORAL at 10:22

## 2025-06-25 ASSESSMENT — ACTIVITIES OF DAILY LIVING (ADL)
ADLS_ACUITY_SCORE: 21
ADLS_ACUITY_SCORE: 31
ADLS_ACUITY_SCORE: 21
ADLS_ACUITY_SCORE: 31
ADLS_ACUITY_SCORE: 21
ADLS_ACUITY_SCORE: 31
ADLS_ACUITY_SCORE: 21
ADLS_ACUITY_SCORE: 21
ADLS_ACUITY_SCORE: 31
ADLS_ACUITY_SCORE: 21
ADLS_ACUITY_SCORE: 21
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
## 2025-06-25 NOTE — PROGRESS NOTES
"Orthopaedic Surgery Progress Note 06/25/2025    Subjective  No acute events overnight.  Pain well controlled. Denies new numbness, tingling, or weakness.  Tolerating diet without nausea or vomiting.  Voiding spontaneously.  Passing flatus, no BM.   Denies fevers, chills, chest pain, SOB.  Ambulating to bathroom. Will work with PT today and likely discharge to home if passes.    Objective  Temp: 97.7  F (36.5  C) Temp src: Oral BP: 106/66 Pulse: 79   Resp: 16 SpO2: 99 % O2 Device: None (Room air) Oxygen Delivery: 3 LPM    Exam:  Gen: No acute distress, resting comfortably in bed.  Resp: Non-labored breathing  Cardio: Regular rate via peripheral pulse  MSK:  RLE:  - Dressings c/d/I  - Fires Quad, TA, GSC, EHL, FHL  - SILT femoral/tibial/sural/saphenous/DP/SP nerves  - PT/DP pulses 2+, foot wwp      No lab results found in last 7 days.    Invalid input(s): \"SEDRATE\"    Assessment: Neil Pompa is a 44 year old male with right hip osteonecrosis now s/p right total hip arthroplasty on 06/24/25 with Dr. Ayala.     Today:  - Discharge to home today pending pain control, medical stability and passing PT    Activity: Up with assist until independent, posterior hip precautions - no flexion past 90, no adduction past midline, no internal rotation past neutral  -Mobilize as early as possible:  -Should be at least up in a chair and to the commode with assist this evening.  -Up to chair for meals and during daylight hours.  -Ambulating about floor tomorrow and thereafter with assistive device.  Delirium precautions: Shades up during the day, quiet at night with minimal distractions, routine orientation. Remove tethers as soon as possible.  Weight bearing status: WBAT.  Pain management: Multimodal pain management with scheduled tylenol, NSAID, low dose oral narcotic, IV narcotic for breakthrough  Antibiotics: Ancef/vancomycin x 24 hours  Brace: Hip abduction pillow at night/while sleeping  Diet: Begin with clear fluids and " progress diet as tolerated.   DVT prophylaxis: ASA 162mg daily x 4 weeks starting POD1  Imaging: AP pelvis and cross table lateral in PACU.  Labs: Monitor Hgb POD 1,2  Dressings: Keep clean, dry and intact until follow-up  Physical Therapy/Occupational Therapy: PT for mobilization, ADLs, fall prevention, gait training, assistive devices.  Consults: IM, SW, PT, OT  Follow-up: Clinic 4 weeks with Dr. Ayala team (see below for scheduled orthopaedic and PT/OT follow-up)  Disposition: Pending pain control, PT, medical stability, likely discharge to home POD1-2    Mandeep Caban MD  Orthopaedic Surgery Resident  [facility]  [phone]    Please page me at [phone] with any questions/concerns. If there is no response, if it is a weekend, or if it is during normal workday hours, then please page the orthopaedic surgery resident on call.     Future Appointments   Date Time Provider Department Center   6/25/2025  9:00 AM Francesca Sarabia, PT [facility] [place]   6/25/2025 10:30 AM Yvette Tipton, OTR [facility] [place]   7/1/2025 12:00 PM Opsahl, Kayla, PT [facility]   7/3/2025 12:00 PM Opsahl, Kayla, PT [facility]   7/8/2025 12:00 PM Opsahl, Kayla, PT [facility]   7/10/2025 12:00 PM Opsahl, Kayla, PT [facility]   7/15/2025 12:00 PM Opsahl, Kayla, PT [facility]   7/21/2025  9:30 AM Trey Winkler, ASHLIE [facility]

## 2025-06-25 NOTE — DISCHARGE SUMMARY
ORTHOPAEDIC SURGERY DISCHARGE SUMMARY     Date of Admission: 6/24/2025  Date of Discharge: 6/25/2025  1:07 PM  Disposition: Home  Staff Physician: No att. 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  The patient had previously undergone a right core decompression with bone marrow concentrated grafting procedure but unfortunately this did not change the outcome and the osteonecrosis progressed to the point where he has debilitating pain that limits his ability to participate in activities of daily living.  After discussion of the risks and benefits of surgical intervention in the form of total hip arthroplasty, the patient elected to proceed with the procedure.     HOSPITAL COURSE:    The patient was admitted following the above listed procedures for pain control and rehabilitation. Neil Pompa did well post-operatively. Medicine was consulted post operatively to aid in management of medical co-morbidities. The patient received routine nursing cares and at the time of discharge was medically stable. Vital signs were stable throughout admission. The patient is tolerating a regular diet and is voiding spontaneously. All PT/OT goals have been met for safe mobility. Pain is now controlled on oral medications which will be available on discharge. Stool softeners have been used while taking pain medications to help prevent constipation. Neil Pompa is deemed medically safe to discharge.     Antibiotics:  Ancef and vanco given periop and 24 hours postop.   DVT prophylaxis:  ASA 81mg BID initiated after surgery and will be continued  for 4 weeks.   PT Progress:  Has met PT/OT goals for safe mobility.    Pain Meds:  Weaned off all IV pain meds by discharge.  Inpatient Events: No significant events or complications.     PHYSICAL EXAM:    See day of discharge progress note    FOLLOWUP:    Follow up with Dr. Ayala at 4 weeks postoperatively.    Future Appointments   Date Time Provider Department Center   7/1/2025 12:00 PM Opsahl, Kayla, PT [facility]   7/3/2025 12:00 PM Opsahl, Kayla, PT [facility]   7/8/2025 12:00 PM Opsahl, Kayla, PT [facility]   7/10/2025 12:00 PM Opsahl, Kayla, PT [facility]   7/15/2025 12:00 PM Opsahl, Kayla, PT [facility]   7/21/2025  9:30 AM Trey Winkler PA-C [facility]       Orthopaedic Surgery appointments are at the [facility] ([address] [place] [address]). Call [phone]  to schedule a follow-up appointment at this location with your provider.     PLANNED DISCHARGE ORDERS:      Discharge Medication List as of 6/25/2025 11:15 AM        START taking these medications    Details   !! aspirin (ASA) 325 MG EC tablet Take 1 tablet (325 mg) by mouth 2 times daily., Disp-60 tablet, R-0, E-Prescribe      !! aspirin (ASA) 81 MG EC tablet Take 1 tablet (81 mg) by mouth 2 times daily., Disp-60 tablet, R-0, E-Prescribe      methocarbamol (ROBAXIN) 500 MG tablet Take 1 tablet (500 mg) by mouth every 6 hours as needed for muscle spasms., Disp-20 tablet, R-0, E-Prescribe      oxyCODONE (ROXICODONE) 5 MG tablet Take 1-2 tablets (5-10 mg) by mouth every 4 hours as needed for moderate to severe pain., Disp-26 tablet, R-0, E-Prescribe      polyethylene glycol (MIRALAX) 17 g packet Take 17 g by mouth daily., Disp-7 packet, R-0, E-Prescribe      senna-docusate (SENOKOT-S/PERICOLACE) 8.6-50 MG tablet Take 1-2 tablets by mouth 2 times daily. Take while on oral narcotics to prevent or treat constipation., Disp-30 tablet, R-0, E-PrescribeWhile taking narcotics       !! - Potential duplicate  "medications found. Please discuss with provider.        CONTINUE these medications which have CHANGED    Details   acetaminophen (TYLENOL) 325 MG tablet Take 2 tablets (650 mg) by mouth every 4 hours as needed for other (mild pain)., Disp-100 tablet, R-0, E-Prescribe           CONTINUE these medications which have NOT CHANGED    Details   GENVOYA [phone] MG PO TABS Take 1 tablet by mouth every morning, NATHALIE, Historical      loratadine (CLARITIN) 10 MG tablet Take 10 mg by mouth daily., Historical           STOP taking these medications       celecoxib (CELEBREX) 200 MG capsule Comments:   Reason for Stopping:                 Discharge Procedure Orders   Referral Order to Outpatient Physical Therapy   Standing Status: Future   Referral Priority: Routine Referral Type: Rehab Therapy Physical Therapy   Number of Visits Requested: 1     Reason for your hospital stay   Order Comments: Following hip surgery     When to call - Contact Surgeon Team   Order Comments: You may experience symptoms that require follow-up before your scheduled appointment. Refer to the \"Stoplight Tool\" in your discharge papers for instructions on when to contact Dr. Ayala's office if you are concerned about pain control, blood clots, constipation, or if you are unable to urinate.  You may also contact Dr. Ayala's team via MyChart messaging.    Regular business hours (Monday - Friday, 8am - 5pm):  [facility]: [phone]    After hours and weekends:  [facility] on call Orthopedic resident: [phone]     When to call - Reach out to Urgent Care   Order Comments: If you are not able to reach Dr. Ayala's office and you need immediate care, go to the Orthopedic Urgent Care at your Surgeon's office.  Do NOT go to the Emergency Room unless you have shortness of breath, chest pain, or other signs of a medical emergency.     When to call - Reasons to Call 911   Order Comments: Call " 911 immediately if you experience sudden-onset chest pain, arm weakness/numbness, slurred speech, or shortness of breath     Discharge Instruction - Breathing exercises   Order Comments: Perform breathing exercises using your Incentive Spirometer 10 times per hour while awake for 2 weeks.     Symptoms - Fever Management   Order Comments: A low grade fever can be expected after surgery.  Use acetaminophen (TYLENOL) as needed for fever management.  Contact your Surgeon Team if you have a fever greater than 101.5 F, chills, and/or night sweats.     Symptoms - Constipation management   Order Comments: Constipation (hard, dry bowel movements) is expected after surgery due to the combination of being less active, the anesthetic, and the opioid pain medication.  You can do the following to help reduce constipation:  ~  FLUIDS:  Drink clear liquids (water or Gatorade), or juice (apple/prune).  ~  DIET:  Eat a fiber rich diet.    ~  ACTIVITY:  Get up and move around several times a day.  Increase your activity as you are able.  MEDICATIONS:  Reduce the risk of constipation by starting medications before you are constipated.  You can take Miralax   (1 packet as directed) and/or a stool softener (Senokot 1-2 tablets 1-2 times a day).  If you already have constipation and these medications are not working, you can get magnesium citrate and use as directed.  If you continue to have constipation you can try an over the counter suppository or enema.  Call your Surgeon Team if it has been greater than 3 days since your last bowel movement.     Symptoms - Reduced Urine Output   Order Comments: Changes in the amount of fluids you drank before and after surgery may result in problems urinating.  It is important to stay well-hydrated after surgery and drink plenty of water. If it has been greater than 8 hours since you have urinated despite drinking plenty of water, call your Surgeon Team.     Medication instructions -  Anticoagulation  - aspirin   Order Comments: Take the aspirin as prescribed for a total of four weeks after surgery.  This is given to help minimize your risk of blood clot.     Activity - Exercises to prevent blood clots   Order Comments: Unless otherwise directed by your Surgeon team, perform the following exercises at least three times per day for the first four weeks after surgery to prevent blood clots in your legs: 1) Point and flex your feet (Ankle Pumps), 2) Move your ankle around in big circles, 3) Wiggle your toes, 4) Walk, even for short distances, several times a day, will help decrease the risk of blood clots.     Order Specific Question Answer Comments   Is discharge order? Yes      Comfort and Pain Management - Pain after Surgery   Order Comments: Pain after surgery is normal and expected.  You will have some amount of pain for several weeks after surgery.  Your pain will improve with time.  There are several things you can do to help reduce your pain including: rest, ice, elevation, and using pain medications as needed. Contact your Surgeon Team if you have pain that persists or worsens after surgery despite rest, ice, elevation, and taking your medication(s) as prescribed. Contact your Surgeon Team if you have new numbness, tingling, or weakness in your operative extremity.     Comfort and Pain Management - Swelling after Surgery   Order Comments: Swelling and/or bruising of the surgical extremity is common and may persist for several months after surgery. In addition to frequent icing and elevation, gentle compressive support with an ACE wrap or tubigrip may help with swelling. Apply compression regularly, removing at least twice daily to perform skin checks. Contact your Surgeon Team if your swelling increases and is NOT associated with an increase in your activity level, or if your swelling increases and is associated with redness and pain.     Comfort and Pain Management - LOWER Extremity Elevation   Order  "Comments: Swelling is expected for several months after surgery. This type of swelling is usually associated with gravity and activity, and can be improved with elevation.   The best way to do this is to get your \"toes above your nose\" by laying down and placing several pillows lengthwise under your calf and heel. When elevating your leg keep your knee completely straight. Perform this elevation as often as possible especially for the first two weeks after surgery.     Comfort and Pain Management - Cold therapy   Order Comments: Ice can be used to control swelling and discomfort after surgery. Place a thin towel over your operative site and apply the ice pack overtop. Leave ice pack in place for 20 minutes, then remove for 20 minutes. Repeat this 20 minutes on/20 minutes off routine as often as tolerated.     Medication Instructions - Acetaminophen (TYLENOL) Instructions   Order Comments: You were discharged with acetaminophen (TYLENOL) for pain management after surgery. Acetaminophen most effectively manages pain symptoms when it is taken on a schedule without missing doses (every four, six, or eight hours). Your Provider will prescribe a safe daily dose between 3000 - 4000 mg.  Do NOT exceed this daily dose. Most patients use acetaminophen for pain control for the first four weeks after surgery.  You can wean from this medication as your pain decreases.     Medication Instructions - NSAID Instructions   Order Comments: You were discharged with an anti-inflammatory medication for pain management to use in combination with acetaminophen (TYLENOL) and the narcotic pain medication.  Take this medication exactly as directed.  You should only take one anti-inflammatory at a time.  Some common anti-inflammatories include: ibuprofen (ADVIL, MOTRIN), naproxen (ALEVE, NAPROSYN), celecoxib (CELEBREX), meloxicam (MOBIC), ketorolac (TORADOL).  Take this medication with food and water.     Follow Up Care   Order Comments: " "Follow up with Dr. Ayala or Trey Winkler PA-C in 4 weeks.     [facility]  [address] [place] [address]  [phone]     Activity - Total Hip Arthroplasty   Order Comments: Refer to the [facility] \"Your Guide to Total Joint Replacement\" for recommendations on activities and Exercises.     Order Specific Question Answer Comments   Is discharge order? Yes      Return to Driving   Order Comments: Return to driving - Driving is NOT permitted until directed by your provider. Under no circumstance are you permitted to drive while using narcotic pain medications.     Order Specific Question Answer Comments   Is discharge order? Yes      No flexion past 90 degrees   Order Comments: No bending at waist past 90 degrees.     Order Specific Question Answer Comments   Is discharge order? Yes      No internal rotation   Order Comments: No pivoting on your operative leg.     Order Specific Question Answer Comments   Is discharge order? Yes      No adduction past midline   Order Comments: No crossing your operative leg.     Order Specific Question Answer Comments   Is discharge order? Yes      Weight bearing as tolerated   Order Comments: Weight bearing as tolerated on your operative extremity.     Order Specific Question Answer Comments   Is discharge order? Yes      Dressing / Wound Care - Wound   Order Comments: Remove dressings after 7 days when it is OK to shower. Sponge baths only for 7 days. No submersing the incision in a bath for 4 weeks. OK to cut suture tails at skin after 2 weeks.     Dressing / Wound care - Shower with wound/dressing covered   Order Comments: You must COVER your dressing or incision with saran wrap (or any other non-permeable covering) to allow the incision to remain dry while showering.  You may shower 7 days after surgery when is it OK for the incision to get wet. You are strictly prohibited from soaking   or submerging the " surgical wound underwater until your follow up visit.     Dressing / Wound Care - NO Tub Bathing   Order Comments: Tub bathing, swimming, or any other activities that will cause your incision to be submerged in water should be avoided until allowed by your Surgeon.     Opioid Instructions (Less than 65 years)   Order Comments: You were discharged with an opioid medication (hydromorphone, oxycodone, hydrocodone, or tramadol). This medication should only be taken for breakthrough pain that is not controlled with acetaminophen (TYLENOL). If you rate your pain less than 3 you do not need this medication. Pain rating 0-3: You do not need this medication. Pain rating 4-6: Take 1 tablet every 4-6 hours as needed Pain rating 7-10: Take 2 tablets every 4-6 hours as needed. Do not exceed 6 tablets per day     Medication Instructions - Opioids - Tapering Instructions   Order Comments: In the first three days following surgery, your symptoms may warrant use of the narcotic pain medication every four to six hours as prescribed. This is normal. As your pain symptoms improve, focus your efforts on decreasing (tapering) use of narcotic medications. The most successful tapering strategy is to first, decrease the number of tablets you take every 4-6 hours to the minimum prescribed. Then, increase the amount of time between doses. For example: First, taper to   or 1 tablet every 4-6 hours. Then, taper to   or 1 tablet every 6-8 hours. Then, taper to   or 1 tablet every 8-10 hours. Then, taper to   or 1 tablet every 10-12 hours. Then, taper to   or 1 tablet at bedtime. The bedtime dose can help with comfort during sleep and is typically the last dose to be discontinued after surgery.     Crutches DME   Order Comments: DME Documentation: Describe the reason for need to support medical necessity: Impaired gait status post hip surgery. I, the undersigned, certify that the above prescribed supplies are medically necessary for this patient  and is both reasonable and necessary in reference to accepted standards of medical practice in the treatment of this patient's condition and is not prescribed as a convenience.     Order Specific Question Answer Comments   Medical Equipment (DME) Supplier: [employer]    PATIENT INSTRUCTIONS: If you did not receive this ordered item today, please contact [employer] for availability ([place] Locations: [phone], [place]: [phone]).    Crutch Type: Standard    Crutches Add On: NA    Length of Need: Lifetime      Cane DME   Order Comments: DME Documentation: Describe the reason for need to support medical necessity: Impaired gait status post hip surgery. I, the undersigned, certify that the above prescribed supplies are medically necessary for this patient and is both reasonable and necessary in reference to accepted standards of medical practice in the treatment of this patient's condition and is not prescribed as a convenience.     Order Specific Question Answer Comments   Medical Equipment (DME) Supplier: [employer]    PATIENT INSTRUCTIONS: If you did not receive this ordered item today, please contact [employer] for availability ([facility] Locations: [phone], [place]: [phone]).    Cane Type: Single Tip    Reminder: Patient can typically get 1 every 5 years      Walker DME   Order Comments: DME Documentation: Describe the reason for need to support medical necessity: Impaired gait status post hip surgery. I, the undersigned, certify that the above prescribed supplies are medically necessary for this patient and is both reasonable and necessary in reference to accepted standards of medical practice in the treatment of this patient's condition and is not prescribed as a convenience.     Order Specific Question Answer Comments   Medical Equipment (DME) Supplier: [employer]    PATIENT INSTRUCTIONS: If you  did not receive this ordered item today, please contact [facility] for availability ([facility] Locations: [phone], [place]: [phone]).    Walker Type: Standard (2 Wheel)    Accessories: N/A      Discharge Instruction - Regular Diet Adult     Order Specific Question Answer Comments   Is discharge order? Yes          Mandeep Caban MD  Orthopaedic Surgery Resident  [facility]  27

## 2025-06-25 NOTE — PLAN OF CARE
Physical Therapy Discharge Summary    Reason for therapy discharge:    All goals and outcomes met, no further needs identified.    Progress towards therapy goal(s). See goals on Care Plan in [facility] electronic health record for goal details.  Goals met    Therapy recommendation(s):    Continued therapy is recommended.  Rationale/Recommendations:  plans to continue w/ OP PT.

## 2025-06-25 NOTE — PLAN OF CARE
"Goal Outcome Evaluation:      Plan of Care Reviewed With: patient    Overall Patient Progress: improving    VS: /66 (BP Location: Right arm, Patient Position: Semi-Burk's, Cuff Size: Adult Regular)   Pulse 79   Temp 97.7  F (36.5  C) (Oral)   Resp 16   Ht 1.778 m (5' 10\")   Wt 88 kg (194 lb 0.1 oz)   SpO2 99%   BMI 27.84 kg/m     O2: O2>92% on RA   Output: Continent of B&B, Voiding spontaneously without difficulty in BR   Activity: SBA with walker & GB   Skin: R hip incision   Pain: Pain controlled with PRN IV dilaudid for breakthrough pain, PRN PO dilaudid and scheduled tylenol   Neuro: A&Ox4   Dressing: R hip surgical drsg CDI   Diet: Regular diet   LDA: L PIV SL   Equipment: Walker, GB, personal belongings, IV pole   Plan: DC home today pending PT/OT and pain control   Additional Info: Patient able to make needs known using call light appropriately, call light in reach, continue POC, report given to oncoming RN     "

## 2025-06-25 NOTE — OP NOTE
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   PROCEDURE: Right total hip arthroplasty.   COMPONENTS USED:   Implant Name Type Inv. Item Serial No.  Lot No. LRB No. Used Action   IMP SHELL BIOM G7 ACETAB PPS LINN HOLE 56MM SZ F [id number] - [id number] Total Joint Component/Insert IMP SHELL BIOM G7 ACETAB PPS LINN HOLE 56MM SZ F [id number]  [employer] [id number] Right 1 Implanted   LINER ACTB G7 F 36MM VIVACIT-E HIP STRL LUM LF [id number] - [id number] Total Joint Component/Insert LINER ACTB G7 F 36MM VIVACIT-E HIP STRL LUM LF [id number]  [employer] [id number] Right 1 Implanted   IMP STEM FEM BIOM TAPERLOC STD OFFSET TYPE 1 [facility] -110 - [id number] Total Joint Component/Insert IMP STEM FEM BIOM TAPERLOC STD OFFSET TYPE 1 [facility] -110  [employer] [id number] Right 1 Implanted   HEAD FEM 0MM OFST 36MM HIP ACTB MDLR TY 1 BLX D G7 - [id number] Total Joint Component/Insert HEAD FEM 0MM OFST 36MM HIP ACTB MDLR TY 1 BLX D G7  [employer] [id number] Right 1 Implanted   56mm G7 PPS limited hole cup  36mm posterior lip liner  Taperlock standard offset size 11 stem  36mm +0 femoral head   INDICATIONS: Neil Pompa is a 44-year-old male with bilateral osteonecrosis of the femoral head with Arco stage IV changes of the right femoral head, groin pain, and failure of conservative management.  The patient had previously undergone a right core decompression with bone marrow concentrated grafting procedure but unfortunately this did not change the outcome and the osteonecrosis progressed to the point where he has debilitating pain that limits his ability to participate in activities of daily living.  After discussion of the risks and benefits of surgical intervention in the form of total hip arthroplasty, the  patient elected to proceed with the procedure.  DESCRIPTION OF PROCEDURE: The patient was placed on the operating table in the lateral decubitus position after initiation of spinal anesthesia. With the operated hip turned up, standard prep and drape was performed.   A minimal incision posterior lateral approach to the hip joint was then performed making a curvilinear incision over the greater trochanter and taking this down through the subcutaneous tissue. The gluteus sagrario was then split in line with its fibers. After internal rotation of the femur, the piriformis and obturator internus muscles were detached and the soft tissue capsule was divided from the posterior aspect of the femoral neck. The tendons were tagged with a suture. 2-0 silk sutures were used to control the circumflex vessels. At this point, the capsule was incised superiorly and inferiorly as a rhomboid shaped trap-door. The hip joint was then dislocated. A femoral neck osteotomy was performed at the mid portion of the femoral neck and head was removed. The femur was then displaced anteriorly and the acetabulum was exposed after additional capsular release as needed.   The acetabulum was reamed by medializing to within 2 mm of the medial wall. Sequentially enlarging reaming was performed to a 1 mm undersize relative to the cup size implanted. A size 56mm cup was selected as the optimal implant. It was impacted into a 45-degree abducted and 20-degree anteverted position. A final polyethylene lipped liner was placed with the lip directed posteriorly.   Attention was now directed to the femur. Sequential broaching was performed to allow placement of a size 11 trial broach. Calcar planning was performed. After trialing with the broach and a +0 head with standard offset neck, the hip was found to be secure and stable in full extension and external rotation of > 45 degrees as well as flexion of 90 degrees combined with internal rotation to  approximately 60 degrees of internal rotation. Leg lengths were judged to be within 5 mm of symmetry.   Final femoral implant was opened and a size 11 standard offset femoral stem was implanted. There is no evidence of any fracture of the femur during insertion. Again, trial reduction was performed and +0 head demonstrated excellent stability. The range of motion was confirmed again and the hip was stable. The final head was impacted on the Tobin taper junction after it had been cleansed and dried.  A thorough irrigation of the wound was now performed. Wound closure was accomplished by reapproximating the posterior capsular soft tissues as well as obturator internus and the piriformis muscles. The remainder of the wound was closed in layers with nonabsorbable sutures for IT band and absorbable suture for subdermal and dermal layers using standard technique. Dermabond and sterile dressing was applied.   Patient was transferred from operative table to PACU bed after application of a hip abduction pillow.  Dr. Ayala was present and scrubbed for all critical portions of the procedure.  Plan:  Activity: Up with assist until independent, posterior hip precautions - no flexion past 90, no adduction past midline, no internal rotation past neutral  -Mobilize as early as possible:  -Should be at least up in a chair and to the commode with assist this evening.  -Up to chair for meals and during daylight hours.  -Ambulating about floor tomorrow and thereafter with assistive device.  Delirium precautions: Shades up during the day, quiet at night with minimal distractions, routine orientation. Remove tethers as soon as possible.  Weight bearing status: WBAT.  Pain management: Multimodal pain management with scheduled tylenol, NSAID, low dose oral narcotic, IV narcotic for breakthrough  Antibiotics: Ancef and Vanco x 24 hours  Brace: Hip abduction pillow at night/while sleeping  Diet: Begin with clear fluids and progress diet as  tolerated.   DVT prophylaxis: ASA 325mg BID x 4 weeks starting POD1  Imaging: AP pelvis and cross table lateral in PACU.  Labs: Monitor Hgb POD 1,2  Dressings: Keep clean, dry and intact until follow-up  Physical Therapy/Occupational Therapy: PT for mobilization, ADLs, fall prevention, gait training, assistive devices.  Consults: IM, SW, PT, OT  Follow-up: Clinic 2 weeks with Dr. Ayala team   Disposition: Pending pain control, PT, medical stability, likely discharge to home POD1-2      Mandeep Caban MD  Orthopaedic Surgery Resident  Pager: [phone]  06/24/25      Attending MD (Dr. Kenyon Ayala) Attestation:  I was present during the key portions of the procedure and I was immediately available for the entire procedure between opening and closing.    MD Anjel Baeza Family Professor  Oncology and Adult Reconstructive Surgery  Dept Orthopaedic Surgery, [facility]

## 2025-06-25 NOTE — PLAN OF CARE
"Goal Outcome Evaluation:           Overall Patient Progress: no changeOverall Patient Progress: no change         VS: /71 (BP Location: Right arm)   Pulse 78   Temp 97.8  F (36.6  C) (Axillary)   Resp 16   Ht 1.778 m (5' 10\")   Wt 88 kg (194 lb 0.1 oz)   SpO2 99%   BMI 27.84 kg/m      O2: O2>92% on room air  Denies chest pain and SOB    Output: Incontinent of bowel 1x this shift. Loose stool. LBM: 6/25.Denies abdominal pain  Voiding spontaneously without difficulty in BR   Activity: SBA with walker & GB   Skin: R hip surgical incision   Pain: Pain managed with PRN meds    Neuro: A&Ox4   Dressing: R hip surgical drsg CDI   Diet: Regular diet   LDA: none    Equipment: Walker, GB, personal belongings, IV pole   Plan: Pt to follow discharge instructions    Additional Info:                DISCHARGE SUMMARY    Pt discharging to: home  Transportation: Emily/family friend   AVS given and discussed: Pt was given AVS and pt states understanding of content. Pt has no further questions.   Stoplight Tool given and discussed: Yes, no further questions.  Medications given: Yes, discussed. No further questions.   Belongings returned: Yes, ensured all belongings packed and sent with pt. No items in security.   Comments: Escorted safely to elevators. Pt left at 1315          "

## 2025-06-25 NOTE — PROGRESS NOTES
[facility]                                                                                   OUTPATIENT PHYSICAL THERAPY    PLAN OF TREATMENT FOR OUTPATIENT REHABILITATION   Patient's Last Name, First Name, Neil Graham YOB: 1980   Provider's Name   [facility]   Medical Record No.  [id number]     Onset Date: 06/24/25 Start of Care Date:  6/25/25     Medical Diagnosis:                  PT Diagnosis:  impaired functional mobility Certification Dates:  From:  6/25/25  To:  6/25/25       See note for plan of treatment, functional goals, and certification details.    I CERTIFY THE NEED FOR THESE SERVICES FURNISHED UNDER        THIS PLAN OF TREATMENT AND WHILE UNDER MY CARE (Physician co-signature of this document indicates review and certification of the therapy plan).              06/25/25 1200   Appointment Info   Signing Clinician's Name / Credentials (PT) Francesca Sarabia DPT   Rehab Comments (PT) RTHA, posterior hip prec   Living Environment   People in Home alone   Current Living Arrangements house   Home Accessibility stairs to enter home;stairs within home   Number of Stairs, Main Entrance 3   Stair Railings, Main Entrance railings on both sides of stairs   Number of Stairs, Within Home, Primary greater than 10 stairs   Stair Railings, Within Home, Primary railing on left side (ascending)  (can reach wall on opposite side)   Transportation Anticipated family or friend will provide   Living Environment Comments Pt lives alone in multi level house. Reports all needs on main level as needed at home. Reports plan for friends to help out and stay occasionally as needed but will have help first few nights   Self-Care   Usual Activity Tolerance excellent   Current Activity Tolerance good   Equipment Currently Used at Home none   Fall history within last six months no   Activity/Exercise/Self-Care Comment ind baseline   General  Information   Onset of Illness/Injury or Date of Surgery 06/24/25   Referring Physician Mandeep Caban MD   Patient/Family Therapy Goals Statement (PT) to go home today   Pertinent History of Current Problem (include personal factors and/or comorbidities that impact the POC) Neil Pompa is a 44 year old male with right hip osteonecrosis now s/p right total hip arthroplasty on 06/24/25 with Dr. Ayala.   Existing Precautions/Restrictions no hip IR;no hip ADD past midline;90 degree hip flexion;weight bearing   Weight-Bearing Status - LUE full weight-bearing   Weight-Bearing Status - RUE full weight-bearing   Weight-Bearing Status - LLE full weight-bearing   Weight-Bearing Status - RLE weight-bearing as tolerated   General Observations pt in hallway w/ OT and PT took over for stair and gait training, pt mobilizing very well   Cognition   Affect/Mental Status (Cognition) WFL   Pain Assessment   Patient Currently in Pain Yes, see Vital Sign flowsheet   Integumentary/Edema   Integumentary/Edema Comments post op incision w/ dressing in place   Posture    Posture Not impaired   Range of Motion (ROM)   Range of Motion ROM deficits secondary to surgical procedure;ROM deficits secondary to pain   Strength (Manual Muscle Testing)   Strength (Manual Muscle Testing) strength is WFL   Bed Mobility   Comment, (Bed Mobility) ind bed mob   Transfers   Comment, (Transfers) kim transfer   Gait/Stairs (Locomotion)   Comment, (Gait/Stairs) kim amb w/ FWW   Balance   Balance no deficits were identified   Sensory Examination   Sensory Perception patient reports no sensory changes   Clinical Impression   Criteria for Skilled Therapeutic Intervention Yes, treatment indicated   PT Diagnosis (PT) impaired functional mobility   Influenced by the following impairments pain, post op prec   Functional limitations due to impairments impaired amb, stairs, transfers   Clinical Presentation (PT Evaluation Complexity) stable   Clinical  Presentation Rationale per clinical judgment   Clinical Decision Making (Complexity) low complexity   Planned Therapy Interventions (PT) bed mobility training;gait training;home exercise program;patient/family education;ROM (range of motion);stair training;strengthening;transfer training;progressive activity/exercise;risk factor education;home program guidelines   Risk & Benefits of therapy have been explained evaluation/treatment results reviewed;care plan/treatment goals reviewed;risks/benefits reviewed;current/potential barriers reviewed;participants voiced agreement with care plan;participants included;patient   Clinical Impression Comments Pt mobilizing very well and motivated to DC home today w/ family support, pt has OP PT set up and denies further questions/concerns for therapy   PT Total Evaluation Time   PT Eval, Low Complexity Minutes (55372) 3   Physical Therapy Goals   PT Frequency One time eval and treatment only   PT Predicted Duration/Target Date for Goal Attainment 06/25/25   PT Goals Bed Mobility;Transfers;Gait;Stairs   PT: Bed Mobility Independent;Supine to/from sit;Within precautions;Goal Met   PT: Transfers Modified independent;Sit to/from stand;Bed to/from chair;Assistive device;Within precautions;Goal Met   PT: Gait Modified independent;Assistive device;Rolling walker;Within precautions;150 feet;Goal Met   PT: Stairs Supervision/stand-by assist;Within precautions;3 stairs;Rail on both sides;Goal Met   Interventions   Interventions Quick Adds Therapeutic Procedure;Gait Training   Therapeutic Procedure/Exercise   Ther. Procedure: strength, endurance, ROM, flexibillity Minutes (65500) 8   Symptoms Noted During/After Treatment none   Treatment Detail/Skilled Intervention Pt sup in bed and completes x5 reps of each to R LE: AP, QS, GS, HS, and heel slides w/ good tolerance overall   Gait Training   Gait Training Minutes (02688) 12   Symptoms Noted During/After Treatment (Gait Training) none    Treatment Detail/Skilled Intervention Patient in hallway ambulating with OT, completed handoff to PT to work on stairs.  Patient ascended 4 stairs with use of B railings, and reciprocal gait pattern, once on top PT demonstrated step to pattern and recommendation of this.  However patient able to complete reciprocally without instability or increased pain and supervision provided.  Educated on step to pattern for descending, patient able to complete 4 stairs with step to pattern mod I with slight B rail support.  Patient wondering if he needs FWW for home, patient ambulated 150 feet with use of FWW mod I and slight antalgic gait noted, PT took away FWW and had patient ambulate without AD and noticed significant increase in lateral trunk sway and antalgic gait pattern for 10 feet.  Patient able to recognize this as well, PT gave back FWW and patient continued for additional 100 feet and he was able to notice the difference and was then agreeable to issuance of FWW for home.  We did educate on weaning from FWW as tolerated and that this can occur during OP PT.  Patient completes SPT with FWW.  Sit to supine independent patient left supine in bed with needs met on PT departure   PT Discharge Planning   PT Plan DC PT   PT Discharge Recommendation (DC Rec) other (see comments)  (defer to ortho)   PT Rationale for DC Rec Pt mobilizing very well and motivated to DC home today w/ family support, pt has OP PT set up and denies further questions/concerns for therapy   PT Brief overview of current status kim w/ FWW   PT Total Distance Amb During Session (feet) 260   PT Equipment Needed at Discharge walker, rolling;dressing equipment   Physical Therapy Time and Intention   Timed Code Treatment Minutes 20   Total Session Time (sum of timed and untimed services) 23

## 2025-06-25 NOTE — PROGRESS NOTES
06/25/25 1010   Appointment Info   Signing Clinician's Name / Credentials (OT) Yvette Tipton OTR/L   Rehab Comments (OT) Posterior   Quick Adds   Quick Adds Certification   Living Environment   People in Home alone   Current Living Arrangements house   Home Accessibility stairs to enter home;stairs within home   Number of Stairs, Main Entrance 3   Stair Railings, Main Entrance railings safe and in good condition   Number of Stairs, Within Home, Primary greater than 10 stairs   Stair Railings, Within Home, Primary railing on left side (ascending)   Transportation Anticipated family or friend will provide   Living Environment Comments Pt lives alone in multi level house. Reports all needs on main level as needed at home. Reports plan for friends to help out and stay occasionally as needed.   Self-Care   Usual Activity Tolerance excellent   Current Activity Tolerance good   Equipment Currently Used at Home none   Fall history within last six months no   Activity/Exercise/Self-Care Comment Reports IND with ADLs/IADLs, walks without AD.   General Information   Onset of Illness/Injury or Date of Surgery 06/24/25   Referring Physician Mandeep Caban MD   Additional Occupational Profile Info/Pertinent History of Current Problem Neil Pompa is a 44 year old male with right hip osteonecrosis now s/p right total hip arthroplasty on 06/24/25 with Dr. Ayala.   Existing Precautions/Restrictions no hip IR;no hip ADD past midline;90 degree hip flexion   Left Upper Extremity (Weight-bearing Status) full weight-bearing (FWB)   Right Upper Extremity (Weight-bearing Status) full weight-bearing (FWB)   Left Lower Extremity (Weight-bearing Status) full weight-bearing (FWB)   Right Lower Extremity (Weight-bearing Status) weight-bearing as tolerated (WBAT)   Cognitive Status Examination   Orientation Status orientation to person, place and time   Cognitive Status Comments WNL   Sensory   Sensory Comments No deficits reported    Pain Assessment   Patient Currently in Pain Yes, see Vital Sign flowsheet   Range of Motion Comprehensive   Comment, General Range of Motion BUE WFL   Strength Comprehensive (MMT)   Comment, General Manual Muscle Testing (MMT) Assessment Gross 5/5 in BUE   Bed Mobility   Comment (Bed Mobility) NT   Transfers   Transfer Comments SBA FWW STS   Activities of Daily Living   BADL Assessment/Intervention bathing;lower body dressing   Bathing Assessment/Intervention   Comment, (Bathing) Per clinical judgement, SBA with shower chair   Lower Body Dressing Assessment/Training   Comment, (Lower Body Dressing) Prior to intervention, Max A d/t precautions   Clinical Impression   Criteria for Skilled Therapeutic Interventions Met (OT) Yes, treatment indicated   OT Diagnosis Decreased ADL/IADL performance   Influenced by the following impairments Post-surgical precautions, pain   OT Problem List-Impairments impacting ADL problems related to;balance;mobility;pain;post-surgical precautions   Assessment of Occupational Performance 1-3 Performance Deficits   Identified Performance Deficits LB dressing, LB bathing   Planned Therapy Interventions (OT) ADL retraining;balance training;transfer training;home program guidelines;progressive activity/exercise;risk factor education   Clinical Decision Making Complexity (OT) problem focused assessment/low complexity   Risk & Benefits of therapy have been explained evaluation/treatment results reviewed;care plan/treatment goals reviewed;risks/benefits reviewed;current/potential barriers reviewed;participants voiced agreement with care plan;participants included;patient   OT Total Evaluation Time   OT Eval, Low Complexity Minutes (89798) 6   Therapy Certification   Start of Care Date 06/24/25   Certification date from 06/24/25   Certification date to 06/25/25   Medical Diagnosis R LEESA   OT Goals   Therapy Frequency (OT) One time eval and treatment   OT Predicted Duration/Target Date for Goal  Attainment 06/25/25   OT Goals Lower Body Dressing;Toilet Transfer/Toileting;OT Goal 1   OT: Lower Body Dressing Modified independent;Goal Met   OT: Toilet Transfer/Toileting Modified independent;Goal Met   OT: Goal 1 Pt will perform shower transfer with Mod I and DME as needed. -Goal Met   Self-Care/Home Management   Self-Care/Home Mgmt/ADL, Compensatory, Meal Prep Minutes (99864) 18   Symptoms Noted During/After Treatment (Meal Preparation/Planning Training) increased pain   Treatment Detail/Skilled Intervention OT: Pt greeted standing in room gathering clothing with RN in room. Pt utilizing FWW occasionally t/o session but reporting prefers to walk without AD. Pt SBA with or without FWW t/o session. Pt able to IND recall posterior hip precautions. Educated pt on implications for ADL/IADL routines and AE for LB dressing and bathing, with pt verbalizing understanding. Performed LB dressing with Mod I using sock aid and reacher donning socks and pants. Educated pt on options to obtain LB dressing AE, with pt requesting to receive prior to d/c. Performed UB dressing with IND in standing. Facilitated functional mobility in hallway with and without FWW with SBA-Mod I to PT gym. Pt remained standing in gym with PT to perform stairs at end of session.   OT Discharge Planning   OT Plan D/c OT   OT Discharge Recommendation (DC Rec) other (see comments)  (defer to ortho)   OT Rationale for DC Rec Pt limited by post-op precautions, but continues to perform ADLs with Mod I and FWW. Pt lives alone but has multiple friends able to check in as needed and all needs on main level. Defer d/c recs to ortho team. No further OT needs as all goals met.   OT Brief overview of current status Mod I FWW   OT Total Distance Amb During Session (feet) 100   Total Session Time   Timed Code Treatment Minutes 18   Total Session Time (sum of timed and untimed services) 24     [facility]                                                                                    OUTPATIENT OCCUPATIONAL THERAPY    PLAN OF TREATMENT FOR OUTPATIENT REHABILITATION   Patient's Last Name, First Name, CELINAMALLIKA PompaNeil YOB: 1980   Provider's Name   [facility]   Medical Record No.  [id number]     Onset Date: 06/24/25 Start of Care Date: 06/24/25     Medical Diagnosis:  R LEESA               OT Diagnosis:  Decreased ADL/IADL performance Certification Dates:  From: 06/24/25  To: 06/25/25     See note for plan of treatment, functional goals, and certification details.    I CERTIFY THE NEED FOR THESE SERVICES FURNISHED UNDER        THIS PLAN OF TREATMENT AND WHILE UNDER MY CARE (Physician co-signature of this document indicates review and certification of the therapy plan).

## 2025-06-26 LAB
ATRIAL RATE - MUSE: 66 BPM
DIASTOLIC BLOOD PRESSURE - MUSE: NORMAL MMHG
INTERPRETATION ECG - MUSE: NORMAL
P AXIS - MUSE: 57 DEGREES
PR INTERVAL - MUSE: 188 MS
QRS DURATION - MUSE: 102 MS
QT - MUSE: 406 MS
QTC - MUSE: 425 MS
R AXIS - MUSE: 10 DEGREES
SYSTOLIC BLOOD PRESSURE - MUSE: NORMAL MMHG
T AXIS - MUSE: 17 DEGREES
VENTRICULAR RATE- MUSE: 66 BPM

## 2025-07-01 ENCOUNTER — THERAPY VISIT (OUTPATIENT)
Dept: PHYSICAL THERAPY | Facility: CLINIC | Age: 45
End: 2025-07-01
Payer: COMMERCIAL

## 2025-07-01 DIAGNOSIS — Z96.641 STATUS POST RIGHT HIP REPLACEMENT: Primary | ICD-10-CM

## 2025-07-01 DIAGNOSIS — M87.9 OSTEONECROSIS OF BOTH HIPS (H): ICD-10-CM

## 2025-07-01 DIAGNOSIS — Z96.641 STATUS POST TOTAL HIP REPLACEMENT, RIGHT: ICD-10-CM

## 2025-07-01 PROCEDURE — 97161 PT EVAL LOW COMPLEX 20 MIN: CPT | Mod: GP | Performed by: PHYSICAL THERAPIST

## 2025-07-01 PROCEDURE — 97110 THERAPEUTIC EXERCISES: CPT | Mod: GP | Performed by: PHYSICAL THERAPIST

## 2025-07-01 ASSESSMENT — ACTIVITIES OF DAILY LIVING (ADL)
SWINGING_OBJECTS_LIKE_A_GOLF_CLUB: EXTREME DIFFICULTY
WALKING_APPROXIMATELY_10_MINUTES: EXTREME DIFFICULTY
SPORTS_SCORE(%): 0
WALKING_15_MINUTES_OR_GREATER: EXTREME DIFFICULTY
WALKING_UP_STEEP_HILLS: EXTREME DIFFICULTY
HOW_WOULD_YOU_RATE_YOUR_CURRENT_LEVEL_OF_FUNCTION?: SEVERELY ABNORMAL
HOW_WOULD_YOU_RATE_YOUR_CURRENT_LEVEL_OF_FUNCTION_DURING_YOUR_USUAL_ACTIVITIES_OF_DAILY_LIVING_FROM_0_TO_100_WITH_100_BEING_YOUR_LEVEL_OF_FUNCTION_PRIOR_TO_YOUR_HIP_PROBLEM_AND_0_BEING_THE_INABILITY_TO_PERFORM_ANY_OF_YOUR_USUAL_DAILY_ACTIVITIES?: 60
WALKING_INITIALLY: EXTREME DIFFICULTY
RECREATIONAL ACTIVITIES: EXTREME DIFFICULTY
STEPPING_UP_AND_DOWN_CURBS: EXTREME DIFFICULTY
ADL_COUNT: 17
PUTTING ON SOCKS AND SHOES: EXTREME DIFFICULTY
TWISTING/PIVOTING_ON_INVOLVED_LEG: EXTREME DIFFICULTY
ADL_SCORE(%): 0
GETTING_INTO_AND_OUT_OF_A_BATHTUB: EXTREME DIFFICULTY
GOING UP 1 FLIGHT OF STAIRS: EXTREME DIFFICULTY
ABILITY_TO_PARTICIPATE_IN_YOUR_DESIRED_SPORT_AS_LONG_AS_YOU_WOULD_LIKE: EXTREME DIFFICULTY
STANDING_FOR_15_MINUTES: EXTREME DIFFICULTY
LOW_IMPACT_ACTIVITIES_LIKE_FAST_WALKING: EXTREME DIFFICULTY
STANDING FOR 15 MINUTES: EXTREME DIFFICULTY
GETTING INTO AND OUT OF AN AVERAGE CAR: EXTREME DIFFICULTY
ROLLING_OVER_IN_BED: EXTREME DIFFICULTY
ADL_TOTAL_ITEM_SCORE: 0
WALKING_DOWN_STEEP_HILLS: EXTREME DIFFICULTY
GOING_UP_1_FLIGHT_OF_STAIRS: EXTREME DIFFICULTY
SPORTS_COUNT: 9
RUNNING_ONE_MILE: EXTREME DIFFICULTY
DEEP_SQUATTING: EXTREME DIFFICULTY
HOS_ADL_HIGHEST_POTENTIAL_SCORE: 68
LANDING: EXTREME DIFFICULTY
CUTTING/LATERAL_MOVEMENTS: EXTREME DIFFICULTY
SITTING FOR 15 MINUTES: EXTREME DIFFICULTY
ROLLING OVER IN BED: EXTREME DIFFICULTY
LIGHT_TO_MODERATE_WORK: EXTREME DIFFICULTY
WALKING_INITIALLY: EXTREME DIFFICULTY
ADL_HIGHEST_POTENTIAL_SCORE: 68
WALKING_UP_STEEP_HILLS: EXTREME DIFFICULTY
GOING_DOWN_1_FLIGHT_OF_STAIRS: EXTREME DIFFICULTY
SITTING_FOR_15_MINUTES: EXTREME DIFFICULTY
PLEASE_INDICATE_YOR_PRIMARY_REASON_FOR_REFERRAL_TO_THERAPY:: HIP
HOS_ADL_SCORE(%): 25
WALKING_DOWN_STEEP_HILLS: EXTREME DIFFICULTY
GOING DOWN 1 FLIGHT OF STAIRS: EXTREME DIFFICULTY
LIGHT_TO_MODERATE_WORK: EXTREME DIFFICULTY
HEAVY_WORK: EXTREME DIFFICULTY
DEEP SQUATTING: EXTREME DIFFICULTY
GETTING_INTO_AND_OUT_OF_AN_AVERAGE_CAR: EXTREME DIFFICULTY
SPORTS_HIGHEST_POTENTIAL_SCORE: 36
SPORTS_TOTAL_ITEM_SCORE: 0
WALKING_FOR_APPROXIMATELY_10_MINUTES: EXTREME DIFFICULTY
HOW_WOULD_YOU_RATE_YOUR_CURRENT_LEVEL_OF_FUNCTION_DURING_YOUR_SPORTS_RELATED_ACTIVITIES_FROM_0_TO_100_WITH_100_BEING_YOUR_LEVEL_OF_FUNCTION_PRIOR_TO_YOUR_HIP_PROBLEM_AND_0_BEING_THE_INABILITY_TO_PERFORM_ANY_OF_YOUR_USUAL_DAILY_ACTIVITIES?: 50
RECREATIONAL_ACTIVITIES: EXTREME DIFFICULTY
JUMPING: EXTREME DIFFICULTY
HOW_WOULD_YOU_RATE_YOUR_CURRENT_LEVEL_OF_FUNCTION_DURING_YOUR_USUAL_ACTIVITIES_OF_DAILY_LIVING_FROM_0_TO_100_WITH_100_BEING_YOUR_LEVEL_OF_FUNCTION_PRIOR_TO_YOUR_HIP_PROBLEM_AND_0_BEING_THE_INABILITY_TO_PERFORM_ANY_OF_YOUR_USUAL_DAILY_ACTIVITIES?: 60
PUTTING_ON_SOCKS_AND_SHOES: EXTREME DIFFICULTY
GETTING_INTO_AND_OUT_OF_A_BATHTUB: EXTREME DIFFICULTY
ABILITY_TO_PERFORM_ACTIVITY_WITH_YOUR_NORMAL_TECHNIQUE: EXTREME DIFFICULTY
TWISTING/PIVOTING ON INVOLVED LEG: EXTREME DIFFICULTY
HEAVY_WORK: EXTREME DIFFICULTY
HOS_ADL_ITEM_SCORE_TOTAL: 17
STARTING_AND_STOPPING_QUICKLY: EXTREME DIFFICULTY
WALKING_15_MINUTES_OR_GREATER: EXTREME DIFFICULTY
STEPPING UP AND DOWN CURBS: EXTREME DIFFICULTY

## 2025-07-01 NOTE — PROGRESS NOTES
PHYSICAL THERAPY EVALUATION  Type of Visit: Evaluation       Fall Risk Screen:       Subjective         Presenting condition or subjective complaint: hips/p R LEESA with posterior approach DOS 6/24/25 after Osteonecrosis of femoral head pre-collapse stage.  Progressed to 8/30/24 Core Decompression of Femoral Heads bilaterally with Bone Marrow Derived Concentrate Grafting but continued pain and weakness that progressed to the LEESA.  Date of onset: 06/24/25 (DOS)    Relevant medical history:   PTSD, MARY ANN, major depressive disorder, HIV positive, osteonecrosis B hips  Dates & types of surgery:  8/30/24 Core Decompression of Femoral Heads bilaterally with Bone Marrow Derived Concentrate Grafting     Prior diagnostic imaging/testing results: MRI; Bone scan     Prior therapy history for the same diagnosis, illness or injury: No  other than pre-op therapy    Prior Level of Function  Transfers: Independent  Ambulation: Independent  ADL: Independent      Living Environment  Social support: Alone   Type of home: House   Stairs to enter the home: Yes 25 Is there a railing: Yes     Ramp: No   Stairs inside the home: Yes 25 Is there a railing: Yes     Help at home:    Equipment owned: Walker     Employment: No    Hobbies/Interests: track and field    Patient goals for therapy: walk without pain    Pain assessment: See objective evaluation for additional pain details     Objective       Objective   HIP EVALUATION  PAIN:   Pain Level at worst: 7/10  Pain Location: R hip pain generally over the incision  Pain Quality: throbbing, burning  Pain Frequency: intermittent  Pain is Worst: same all the time with pain medicine (keeps it managed)  Pain is Exacerbated  By: getting out of bed, rolling in bed, sitting (patient excessively shifts to L buttock in sitting).  Using walker.  Not having to do stairs right now.    Pain is Relieved By: ice  Pain Progression: hard to tell   Tingling/numbness numbness around incision  Associated symptoms:  swelling, sometimes around hip, ankle, knee    INTEGUMENTARY (edema, incisions):   POSTURE:   GAIT:   Weightbearing Status: WBAT  Assistive Device(s): walker  Gait Deviations: Antalgic  Base of support increased  Stance time decreased  Stride length decreased  Daphne decreased  Decreased heel strike and push off on R    NON-WEIGHTBEARING ALIGNMENT: seated in chair patient excessively shifts weight to left glute to unweight R hip   ROM:   (Degrees) Left AROM Left PROM  Right AROM Right PROM   Hip Flexion 90  70 80   Hip Extension       Hip Abduction 50  12 12   Hip Adduction       Hip Internal Rotation       Hip External Rotation 55  12    Knee Flexion WNL  WNL    Knee Extension 0  0    Lumbar Side glide     Lumbar Flexion    Lumbar Extension    Pain:   End feel:       STRENGTH:   Pain: - none + mild ++ moderate +++ severe  Strength Scale: 0-5/5 Left Right   Hip Flexion 5 3   Hip Extension     Hip Abduction     Hip Adduction     Hip Internal Rotation     Hip External Rotation     Knee Flexion 5 5   Knee Extension 5 4     Patient able to perform good quad set and SAQ.    10-15 degree extensor sag with flexion SLR  Patient able to independently lift R LE into/off of plinth.     Assessment & Plan   CLINICAL IMPRESSIONS  Medical Diagnosis: s/p R LEESA    Treatment Diagnosis: s/p R LEESA   Impression/Assessment: Patient is a 44 year old male with s/p R LEESA complaints.  The following significant findings have been identified: Pain, Decreased ROM/flexibility, Decreased strength, Decreased proprioception, Impaired gait, and Decreased activity tolerance. These impairments interfere with their ability to perform self care tasks, work tasks, recreational activities, household chores, driving , household mobility, and community mobility as compared to previous level of function.     Clinical Decision Making (Complexity):  Clinical Presentation: Evolving/Changing  Clinical Presentation Rationale: based on medical and personal  factors listed in PT evaluation  Clinical Decision Making (Complexity): Low complexity    PLAN OF CARE  Treatment Interventions:  Interventions: Manual Therapy, Neuromuscular Re-education, Therapeutic Activity, Therapeutic Exercise    Long Term Goals     PT Goal 1  Goal Identifier: ambulation  Goal Description: Patient to walk 15 minutes with normal gait pattern without assistive device and 0/10 pain  Rationale: to maximize safety and independence with performance of ADLs and functional tasks;to maximize safety and independence within the home;to maximize safety and independence within the community  Goal Progress: Antalgic gait with walker with decreased matt, step length, stride length, heel strike, push off, up to 7/10 pain at worst  Target Date: 09/16/25      Frequency of Treatment: 2x/week  Duration of Treatment: 3 weeks tapering to 1x/week x 4 weeks tapering to 2x/month x 1 month      Education Assessment:        Risks and benefits of evaluation/treatment have been explained.   Patient/Family/caregiver agrees with Plan of Care.     Evaluation Time:     PT Eval, Low Complexity Minutes (75874): 18       Signing Clinician: NATE [facility]                                                                                   OUTPATIENT PHYSICAL THERAPY      PLAN OF TREATMENT FOR OUTPATIENT REHABILITATION   Patient's Last Name, First Name, Neil Graham YOB: 1980   Provider's Name   [facility]   Medical Record No.  [id number]     Onset Date: 06/24/25 (DOS)  Start of Care Date: 07/01/25     Medical Diagnosis:  s/p R LEESA      PT Treatment Diagnosis:  s/p R LEESA Plan of Treatment  Frequency/Duration: 2x/week/ 3 weeks tapering to 1x/week x 4 weeks tapering to 2x/month x 1 month    Certification date from 07/01/25 to 09/16/25         See note for plan of treatment details and functional goals     Kayla Santos  PT                         I CERTIFY THE NEED FOR THESE SERVICES FURNISHED UNDER        THIS PLAN OF TREATMENT AND WHILE UNDER MY CARE     (Physician attestation of this document indicates review and certification of the therapy plan).              Referring Provider:  Kenyon Ayala    Initial Assessment  See Epic Evaluation- Start of Care Date: 07/01/25

## 2025-07-03 ENCOUNTER — THERAPY VISIT (OUTPATIENT)
Dept: PHYSICAL THERAPY | Facility: CLINIC | Age: 45
End: 2025-07-03
Payer: COMMERCIAL

## 2025-07-03 DIAGNOSIS — Z96.641 STATUS POST RIGHT HIP REPLACEMENT: Primary | ICD-10-CM

## 2025-07-05 ENCOUNTER — TELEPHONE (OUTPATIENT)
Dept: OTHER | Facility: CLINIC | Age: 45
End: 2025-07-05
Payer: COMMERCIAL

## 2025-07-05 DIAGNOSIS — Z96.641 S/P TOTAL RIGHT HIP ARTHROPLASTY: ICD-10-CM

## 2025-07-05 RX ORDER — METHOCARBAMOL 500 MG/1
500 TABLET, FILM COATED ORAL EVERY 6 HOURS PRN
Qty: 20 TABLET | Refills: 0 | Status: SHIPPED | OUTPATIENT
Start: 2025-07-05 | End: 2025-07-07

## 2025-07-05 NOTE — TELEPHONE ENCOUNTER
Brief Telephone Encounter Note    Received a phone call from Neil Pompa. Patient is a patient of Dr. Ayala's. Last had a L LEESA on 6/24/25 with Dr. Ayala.     Patient has no chest pain, SOB. Does not sound significantly weak. Reports he is calling in for a medication refill on oxycodone and robaxin. He has been elevating and icing, ambulating with a walker.     Explained my role as the weekend triage resident for emergencies. At this time, I did not feel like Neil Pompa needed to present to the ED. I reinforced our policy that I am unable to provide narcotic scripts. I am however able to provide a robaxin refill and sent this to his pharmacy. I encouraged to call the office on Monday morning regarding the oxycodone. Neil Pompa was agreeable to this plan.    All questions answered.    --  Maria Guadalupe Meza MD  Orthopaedic Surgery Resident  [facility]  07/05/25

## 2025-07-07 DIAGNOSIS — Z96.641 STATUS POST TOTAL HIP REPLACEMENT, RIGHT: ICD-10-CM

## 2025-07-07 DIAGNOSIS — M87.9 OSTEONECROSIS OF BOTH HIPS (H): ICD-10-CM

## 2025-07-07 DIAGNOSIS — Z96.641 S/P TOTAL RIGHT HIP ARTHROPLASTY: ICD-10-CM

## 2025-07-07 RX ORDER — OXYCODONE HYDROCHLORIDE 5 MG/1
5-10 TABLET ORAL EVERY 4 HOURS PRN
Qty: 26 TABLET | Refills: 0 | Status: SHIPPED | OUTPATIENT
Start: 2025-07-07

## 2025-07-07 RX ORDER — METHOCARBAMOL 500 MG/1
500 TABLET, FILM COATED ORAL EVERY 6 HOURS PRN
Qty: 20 TABLET | Refills: 0 | Status: SHIPPED | OUTPATIENT
Start: 2025-07-07

## 2025-07-07 NOTE — ANESTHESIA PROCEDURE NOTES
"Intrathecal injection Procedure Note    Pre-Procedure   Staff -        Anesthesiologist:  Taya Pisano MD       Performed By: anesthesiologist       Location: OB       Procedure Start/Stop Times: 6/24/2025 12:10 PM and 6/24/2025 12:11 PM       Pre-Anesthestic Checklist: patient identified, IV checked, risks and benefits discussed, informed consent, monitors and equipment checked, pre-op evaluation, at physician/surgeon's request and post-op pain management  Timeout:       Correct Patient: Yes        Correct Procedure: Yes        Correct Site: Yes        Correct Position: Yes   Procedure Documentation  Procedure: intrathecal injection         Patient Position: sitting       Skin prep: Chloraprep       Insertion Site: L3-4. (midline approach).       Needle Gauge: 25.        Needle Length (Inches): 3.5        Spinal Needle Type: Pencan       Introducer used       Introducer: 20 G       # of attempts: 1 and  # of redirects:  0    Assessment/Narrative         Paresthesias: No.       Sensory Level: T8       CSF fluid: clear.       Opening pressure was cmH2O while  Sitting.      Medication(s) Administered   Medication Administration Time: 6/24/2025 12:10 PM      FOR [facility] ([facility]) ONLY:   Pain Team Contact information: please page the Pain Team Via [employer]. Search \"Pain\". During daytime hours, please page the attending first. At night please page the resident first.      "

## 2025-07-07 NOTE — TELEPHONE ENCOUNTER
Other: Rx Refill:   oxyCODONE (ROXICODONE) 5 MG tablet     methocarbamol (ROBAXIN) 500 MG tablet   Ph[id number] [address] [facility] OF  [address]   Could we send this information to you in [facility] or would you prefer to receive a phone call?:   Patient would prefer a phone call   Okay to leave a detailed message?: Yes at Cell number on file:    Telephone Information:   Mobile [phone]

## 2025-07-07 NOTE — ANESTHESIA POSTPROCEDURE EVALUATION
Patient: Neil Pompa    Procedure: Procedure(s):  ARTHROPLASTY, HIP, TOTAL, POSTERIOR APPROACH       Anesthesia Type:  Spinal    Note:  Disposition: Outpatient   Postop Pain Control: Uneventful            Sign Out: Well controlled pain   PONV: No   Neuro/Psych: Uneventful            Sign Out: Acceptable/Baseline neuro status   Airway/Respiratory: Uneventful            Sign Out: Acceptable/Baseline resp. status   CV/Hemodynamics: Uneventful            Sign Out: Acceptable CV status; No obvious hypovolemia; No obvious fluid overload   Other NRE: NONE   DID A NON-ROUTINE EVENT OCCUR? No           Last vitals:  Vitals Value Taken Time   /78 06/24/25 17:30   Temp 36.4  C (97.5  F) 06/24/25 17:15   Pulse 67 06/24/25 17:30   Resp 15 06/24/25 17:15   SpO2 99 % 06/24/25 18:00       Electronically Signed By: Taya Pisano MD  July 6, 2025  9:20 PM

## 2025-07-09 DIAGNOSIS — Z96.641 S/P TOTAL RIGHT HIP ARTHROPLASTY: Primary | ICD-10-CM

## 2025-07-09 NOTE — PROGRESS NOTES
[facility]  Orthopaedic Surgery  by Trey Winkler PA-C    Neil Pompa MRN# [id number]    YOB: 1980       Background history:  DX:  Osteonecrosis of the femoral head, bilaterally, Arco stage II, greater than 30% of femoral head involvement bilaterally. Poss related to HIV medication (protease inhibitors).     TREATMENTS:  6/24/25, Right total hip arthroplasty, (Jamie), [facility]             Assessment and Plan:   Assessment:  44-year-old male who presents today approximately 1 month status post right total hip arthroplasty.  Minor alis-incisional folliculitis noted today but overall recovering appropriately     Plan:  After examining the patient and reviewing his x-rays I extensive discussed my findings.  I see no active signs of infection around his incision today.  I believe his pruritus is secondary to folliculitis likely from the swelling and observable sutures.  I recommend we add hydroxyzine as this will help with both itching and pain control.  He should continue to take oxycodone as needed for pain but I explained that we will wean off of this at 6 weeks postop.  If the hydroxyzine does not help the itching he should use hydrocortisone cream around the incision but should not apply it directly to the incision.  In regards to his night sweats, There was no fever noted today by index of suspicion for infection is low.  This is likely secondary to increase cortisol and healing following total joint replacement.  This will likely resolve with time but she should call if this does not improve or gets worse.  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  After a month he can reach out to us and if he feels restrictions are necessary we are happy to fill out a return to work form.  Reviewed posterior precautions for 3 months postop.  She is only long-term restrictions to avoid high-impact exercises as this could damage  the implants.  You can discontinue aspirin for DVT prophylaxis.  He can weight-bear as tolerated continue to increase his activities as tolerated.  All questions were answered and the patient will follow-up at 1 year postop with repeat x-rays.     Trey Winkler PA-C  Physician Assistant   Oncology and Adult Reconstructive Surgery  Dept Orthopaedic Surgery, [facility]             History of Present Illness:   44 year old male presenting approximate 1 month status post right total hip arthroplasty.  His postoperative recovery has been complicated by uncontrolled pain, alis-incisional itching, and night sweats.  He was seen by Shira Watson PA-C on 7/11/2025 who was not concerned about infection around the incision.  He is making good progress in physical therapy and ambulating with a cane.  Taking aspirin for DVT prophylaxis and denies any chest pain shortness of breath or calf pain.  He states taking oxycodone every 4 hours for pain control.  Patient states that he is concerned about returning to work in September.           Physical Exam:     EXAMINATION pertinent findings:   PSYCH: Pleasant, healthy-appearing, alert, oriented x3, cooperative. Normal mood and affect.  VITAL SIGNS: There were no vitals taken for this visit..  Reviewed nursing intake notes.   There is no height or weight on file to calculate BMI.  RESP: non labored breathing   ABD: benign, soft, non-tender, no acute peritoneal findings  SKIN: grossly normal   LYMPHATIC: grossly normal, no adenopathy, no extremity edema  NEURO: grossly normal , no motor deficits  VASCULAR: satisfactory perfusion of all extremities   MUSCULOSKELETAL:     Oral temperature today: 99  F.    Right hip: The incision is clean, dry, and intact with no erythema, dehiscence, or discharge.  Some minor alis-incisional folliculitis noted.  No significant peripheral edema and calves are soft and nontender with negative Homans' sign.    Right LE:              Thigh and  leg compartments soft and compressible              +Quad/TA/GSC/FHL/EHL              SILT DP/SP/Marc/Saph/Tib nerve distributions              Palpable dorsalis pedis pulse            Data:   All laboratory data reviewed  All imaging studies reviewed by me     XR AP pelvis lateral right hip on 7/21/2025:  My interpretation: Status post right total hip arthroplasty.  Adequate sizing, fixation and orientation of components.  No signs of immediate postoperative complications.

## 2025-07-10 ENCOUNTER — THERAPY VISIT (OUTPATIENT)
Dept: PHYSICAL THERAPY | Facility: CLINIC | Age: 45
End: 2025-07-10
Payer: COMMERCIAL

## 2025-07-10 DIAGNOSIS — Z96.641 STATUS POST RIGHT HIP REPLACEMENT: Primary | ICD-10-CM

## 2025-07-15 ENCOUNTER — THERAPY VISIT (OUTPATIENT)
Dept: PHYSICAL THERAPY | Facility: CLINIC | Age: 45
End: 2025-07-15
Payer: COMMERCIAL

## 2025-07-15 DIAGNOSIS — Z96.641 STATUS POST RIGHT HIP REPLACEMENT: Primary | ICD-10-CM

## 2025-07-15 PROCEDURE — 97112 NEUROMUSCULAR REEDUCATION: CPT | Mod: GP | Performed by: PHYSICAL THERAPIST

## 2025-07-15 PROCEDURE — 97110 THERAPEUTIC EXERCISES: CPT | Mod: GP | Performed by: PHYSICAL THERAPIST

## 2025-07-15 PROCEDURE — 97530 THERAPEUTIC ACTIVITIES: CPT | Mod: GP | Performed by: PHYSICAL THERAPIST

## 2025-07-15 ASSESSMENT — ACTIVITIES OF DAILY LIVING (ADL)
STEPPING_UP_AND_DOWN_CURBS: NO DIFFICULTY AT ALL
HOS_ADL_COUNT: 17
WALKING_DOWN_STEEP_HILLS: NO DIFFICULTY AT ALL
SITTING_FOR_15_MINUTES: MODERATE DIFFICULTY
WALKING_15_MINUTES_OR_GREATER: MODERATE DIFFICULTY
WALKING_APPROXIMATELY_10_MINUTES: SLIGHT DIFFICULTY
WALKING_INITIALLY: SLIGHT DIFFICULTY
GOING_DOWN_1_FLIGHT_OF_STAIRS: NO DIFFICULTY AT ALL
GETTING_INTO_AND_OUT_OF_A_BATHTUB: MODERATE DIFFICULTY
ROLLING_OVER_IN_BED: EXTREME DIFFICULTY
RECREATIONAL_ACTIVITIES: EXTREME DIFFICULTY
STANDING_FOR_15_MINUTES: MODERATE DIFFICULTY
GETTING_INTO_AND_OUT_OF_AN_AVERAGE_CAR: MODERATE DIFFICULTY
LIGHT_TO_MODERATE_WORK: SLIGHT DIFFICULTY
WALKING_UP_STEEP_HILLS: EXTREME DIFFICULTY
PUTTING_ON_SOCKS_AND_SHOES: EXTREME DIFFICULTY
DEEP_SQUATTING: MODERATE DIFFICULTY
HEAVY_WORK: EXTREME DIFFICULTY
HOS_ADL_HIGHEST_POTENTIAL_SCORE: 68
HOS_ADL_SCORE(%): 60.29
TWISTING/PIVOTING_ON_INVOLVED_LEG: MODERATE DIFFICULTY
GOING_UP_1_FLIGHT_OF_STAIRS: NO DIFFICULTY AT ALL
HOS_ADL_ITEM_SCORE_TOTAL: 41

## 2025-07-15 NOTE — PROGRESS NOTES
07/15/25 0500   Appointment Info   Signing clinician's name / credentials Kayla Santos DPT   Total/Authorized Visits 12(E[employer]T)   Visits Used 4   Medical Diagnosis s/p R LEESA   PT Tx Diagnosis s/p R LEESA   Other pertinent information osteonecrosis L hip   Progress Note/Certification   Start of Care Date 07/01/25   Onset of illness/injury or Date of Surgery 06/24/25  (DOS)   Therapy Frequency 1x/week   Predicted Duration 8 weeks   Certification date from 07/01/25   Certification date to 09/16/25   Progress Note Completed Date 07/01/25   PT Goal 1   Goal Identifier ambulation   Goal Description Patient to walk 15 minutes with normal gait pattern without assistive device and 0/10 pain   Rationale to maximize safety and independence with performance of ADLs and functional tasks;to maximize safety and independence within the home;to maximize safety and independence within the community   Goal Progress Ambulation with SEC 5-10 min with almost normal pattern.  Some increased rotation of hips but overall stable.  Trendelenberg R trunk lean with R stance without SEC.   Target Date 09/16/25   Subjective Report   Subjective Report Today 3-4/10 pain R hip.  No longer has the feeling like is sitting on something.  Got a cane and able to walk 5-10 min.  Will start back to work Sept 2nd.   Objective Measure 1   Objective Measure AROM hip flexion L 90, R 80, ABD L 50, R 25, hip ext B 22, ER L 48, R 15 but AAROM ER 40.   Details PROM hip flex 90, ER 50   Objective Measure 2   Objective Measure SLR flexion with no extensor sag.   Objective Measure 3   Objective Measure sit to stand now able to perform from standard chair with even weight bearing, able to ascend/descend 6 inch stairs with UE support.   Details Gait, able to walk with SEC with intermittent R trunk lean but improves with cueing.  Without SEC trendelenberg noted with R trunk lean with R stance.   Treatment Interventions (PT)   Interventions Therapeutic Activity    Therapeutic Procedure/Exercise   Therapeutic Procedures: strength, endurance, ROM, flexibility minutes (10600) 22   Therapeutic Procedures Ther Proc 2;Ther Proc 3;Ther Proc 4   Ther Proc 1 Review posterior hip precautions: avoid flex past 90 deg, avoid ADD of R leg and especially avoid IR of R leg.  Field patient questions on how long before can cross legs   Ther Proc 1 - Details Review use of ice, elevation, ankle pumps to help manage swelling and rest.  discuss can submerg in   Ther Proc 2 PROM hip/knee flexion and hip ABD   Ther Proc 2 - Details supine by therapist x 4 min   Ther Proc 3 Bike SH 5 without shoes on (cue to prevent hip going past 90 deg flexion)   Ther Proc 3 - Details x 7 min for ROM/warm-up   Ther Proc 4 Leg press SH 3   Ther Proc 4 - Details 4 PL x 15, eccentric 4 PL x 12   PTRx Ther Proc 1 Supine Heel Slides   PTRx Ther Proc 1 - Details HEP   PTRx Ther Proc 2 Isometric Quad   PTRx Ther Proc 2 - Details HEP:  cue keep the leg straight not IR'd   PTRx Ther Proc 3 Short Arc Knee Extension (Long Sitting)   PTRx Ther Proc 3 - Details discuss HEP not today:    PTRx Ther Proc 4 Hip Flexion Straight Leg Raise   PTRx Ther Proc 4 - Details SLR independent x 10   PTRx Ther Proc 5 Standing Hip Flexion   PTRx Ther Proc 5 - Details HEP   PTRx Ther Proc 6 Standing Hip Abduction   PTRx Ther Proc 6 - Details HEP   PTRx Ther Proc 7 Hip AROM Standing Extension   PTRx Ther Proc 7 - Details HEP   PTRx Ther Proc 8 Bridging #2A Weight Shift   PTRx Ther Proc 8 - Details bridge #1 5 sec x 5 feels easy Bridge #2a2 sets x 10    PTRx Ther Proc 9 Hip Abduction Straight Leg Raise   PTRx Ther Proc 9 - Details with pillow folded between legs to prevent ADD x 10   PTRx Ther Proc 10 Pretzel Stretch   PTRx Ther Proc 10 - Details 30 sec x 2 with Left foot rested on plinth   Skilled Intervention progress HEP properly   Patient Response/Progress Progressing well with AROM, strength and function.   Therapeutic Activity    Therapeutic Activities: dynamic activities to improve functional performance minutes (18348) 9   Ther Act 1 Stairs- 4 stairs in clinic ascend/descend   Ther Act 1 - Details 4 stairs x 3 cue gluteal activation with R stance   PTRx Ther Act 1 Step Up   PTRx Ther Act 1 - Details 3 4 inch step up x 5 focus on quad control/glut activation.  then up and over 4 inch step x 5   Neuromuscular Re-education   Neuromuscular re-ed of mvmt, balance, coord, kinesthetic sense, posture, proprioception minutes (63179) 18   Neuromuscular Re-education Neuro Re-ed 2;Neuro Re-ed 3   Neuro Re-ed 1 Field patient questions if gets some tingling in lower leg with prolonged sitting just change positions or utilize slouch over-correct motion etc.   Neuro Re-ed 1 - Details Review avoiding resting with leg in IR (patient awakes with leg in IR and falls into this with exercise). Cueing to avoid IR even at rest, continue to utilize wedge/pillow between legs at night to prevent IR.   Neuro Re-ed 2 Ambulation with SEC and without SEC   Neuro Re-ed 2 - Details 30 feet x 4 focus to prevent R trunk lean with gluteal activation, review utilize cane until no gait deviation/compensations   PTRx Neuro Re-ed 1 Sit to Stand   PTRx Neuro Re-ed 1 - Details x 10 good even weight bearing today   PTRx Neuro Re-ed 2 Side Stepping   PTRx Neuro Re-ed 2 - Details 30 feet x 4 discuss can progress with band as needed just avoid any ADDuction   Plan   Home program see ptrx, dawit on phone   Updates to plan of care Added sidestepping, SLR ABD, bridge #2a   Plan for next session BIKE SH 5. Leg press SH 3 eccentric, Walk with and w/o SEC, Rev sidestepping add band?, SLR ABD, bridge #2a, progress stairs,  PROM.  GAIT DRILLS- anterior lateral stepping?  SLS on dynamic surface   Total Session Time   Timed Code Treatment Minutes 49   Total Treatment Time (sum of timed and untimed services) 49         PLAN  Continue therapy per current plan of care.    Beginning/End Dates of  Progress Note Reporting Period:  07/01/25 to 07/15/2025    Referring Provider:  Kenyon Ayala

## 2025-07-21 ENCOUNTER — TELEPHONE (OUTPATIENT)
Dept: ORTHOPEDICS | Facility: CLINIC | Age: 45
End: 2025-07-21

## 2025-07-21 ENCOUNTER — OFFICE VISIT (OUTPATIENT)
Dept: ORTHOPEDICS | Facility: CLINIC | Age: 45
End: 2025-07-21
Payer: COMMERCIAL

## 2025-07-21 ENCOUNTER — ANCILLARY PROCEDURE (OUTPATIENT)
Dept: GENERAL RADIOLOGY | Facility: CLINIC | Age: 45
End: 2025-07-21
Attending: PHYSICIAN ASSISTANT
Payer: COMMERCIAL

## 2025-07-21 VITALS — TEMPERATURE: 99 F

## 2025-07-21 DIAGNOSIS — Z96.641 S/P TOTAL RIGHT HIP ARTHROPLASTY: ICD-10-CM

## 2025-07-21 DIAGNOSIS — Z47.89 ORTHOPEDIC AFTERCARE: Primary | ICD-10-CM

## 2025-07-21 PROCEDURE — 73502 X-RAY EXAM HIP UNI 2-3 VIEWS: CPT | Mod: RT | Performed by: RADIOLOGY

## 2025-07-21 PROCEDURE — 99024 POSTOP FOLLOW-UP VISIT: CPT | Performed by: PHYSICIAN ASSISTANT

## 2025-07-21 RX ORDER — HYDROXYZINE PAMOATE 50 MG/1
50 CAPSULE ORAL 3 TIMES DAILY PRN
Qty: 40 CAPSULE | Refills: 0 | Status: SHIPPED | OUTPATIENT
Start: 2025-07-21

## 2025-07-21 NOTE — LETTER
7/21/2025      Neil Pompa  [address]  [facility] [address]      Dear Colleague,    Thank you for referring your patient, Neil Pompa, to the [facility] [place]. Please see a copy of my visit note below.        [facility]  Orthopaedic Surgery  by Trey Winkler PA-C    Neil Pompa MRN# [id number]    YOB: 1980       Background history:  DX:  Osteonecrosis of the femoral head, bilaterally, Arco stage II, greater than 30% of femoral head involvement bilaterally. Poss related to HIV medication (protease inhibitors).     TREATMENTS:  6/24/25, Right total hip arthroplasty, (Jamie), [facility]             Assessment and Plan:   Assessment:  44-year-old male who presents today approximately 1 month status post right total hip arthroplasty.  Minor alis-incisional folliculitis noted today but overall recovering appropriately     Plan:  After examining the patient and reviewing his x-rays I extensive discussed my findings.  I see no active signs of infection around his incision today.  I believe his pruritus is secondary to folliculitis likely from the swelling and observable sutures.  I recommend we add hydroxyzine as this will help with both itching and pain control.  He should continue to take oxycodone as needed for pain but I explained that we will wean off of this at 6 weeks postop.  If the hydroxyzine does not help the itching he should use hydrocortisone cream around the incision but should not apply it directly to the incision.  In regards to his night sweats, There was no fever noted today by index of suspicion for infection is low.  This is likely secondary to increase cortisol and healing following total joint replacement.  This will likely resolve with time but she should call if this does not improve or gets worse.  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  After a month  he can reach out to us and if he feels restrictions are necessary we are happy to fill out a return to work form.  Reviewed posterior precautions for 3 months postop.  She is only long-term restrictions to avoid high-impact exercises as this could damage the implants.  You can discontinue aspirin for DVT prophylaxis.  He can weight-bear as tolerated continue to increase his activities as tolerated.  All questions were answered and the patient will follow-up at 1 year postop with repeat x-rays.     Trey Winkler PA-C  Physician Assistant   Oncology and Adult Reconstructive Surgery  Dept Orthopaedic Surgery, LTAC, located within [facility]             History of Present Illness:   44 year old male presenting approximate 1 month status post right total hip arthroplasty.  His postoperative recovery has been complicated by uncontrolled pain, alis-incisional itching, and night sweats.  He was seen by Shira Watson PA-C on 7/11/2025 who was not concerned about infection around the incision.  He is making good progress in physical therapy and ambulating with a cane.  Taking aspirin for DVT prophylaxis and denies any chest pain shortness of breath or calf pain.  He states taking oxycodone every 4 hours for pain control.  Patient states that he is concerned about returning to work in September.           Physical Exam:     EXAMINATION pertinent findings:   PSYCH: Pleasant, healthy-appearing, alert, oriented x3, cooperative. Normal mood and affect.  VITAL SIGNS: There were no vitals taken for this visit..  Reviewed nursing intake notes.   There is no height or weight on file to calculate BMI.  RESP: non labored breathing   ABD: benign, soft, non-tender, no acute peritoneal findings  SKIN: grossly normal   LYMPHATIC: grossly normal, no adenopathy, no extremity edema  NEURO: grossly normal , no motor deficits  VASCULAR: satisfactory perfusion of all extremities   MUSCULOSKELETAL:     Oral temperature today: 99  F.    Right hip: The  incision is clean, dry, and intact with no erythema, dehiscence, or discharge.  Some minor alis-incisional folliculitis noted.  No significant peripheral edema and calves are soft and nontender with negative Homans' sign.    Right LE:              Thigh and leg compartments soft and compressible              +Quad/TA/GSC/FHL/EHL              SILT DP/SP/Marc/Saph/Tib nerve distributions              Palpable dorsalis pedis pulse            Data:   All laboratory data reviewed  All imaging studies reviewed by me     XR AP pelvis lateral right hip on 7/21/2025:  My interpretation: Status post right total hip arthroplasty.  Adequate sizing, fixation and orientation of components.  No signs of immediate postoperative complications.           Again, thank you for allowing me to participate in the care of your patient.        Sincerely,        Trey Winkler PA-C    Electronically signed

## 2025-07-21 NOTE — LETTER
[facility] [address]  [address] [facility] [id number] [phone]        June 16, 2025    Regarding:  Neil Pompa  [address] [facility] [address]              To Whom It May Concern;  Neil Pompa is under my care for his upcoming hip replacement surgery on 6/24. He will need to be off of work until 8/25.     If you have further questions, please call the clinic at [phone].       Sincerely,  Kenyon Ayala MD

## 2025-07-21 NOTE — LETTER
[facility] [address]  [address] [facility] [id number] [phone]        June 16, 2025    Regarding:  Neil Pompa  [address] [facility] [address]              To Whom It May Concern;  Neil Pompa is under my care for his upcoming hip replacement surgery on 6/24. He will need to be off of work until 8/25.     If you have further questions, please call the clinic at [phone].       Sincerely,  Kenyon Ayala MD

## 2025-07-21 NOTE — TELEPHONE ENCOUNTER
- A call was placed to the patient.     - He said he spoke with pharmacy and they said they needed an provider's authorization. I let him know I would call them and ask if he could pay out of pocket. He said he was willing to pay out of pocket.     - He needs a work note for his part time works at [employer]. I let him know we would be able to extend this for another month. He requested we send this to him over email.     - Patient verbalized understanding of plan and all questions were answered. Call back number to clinic was given and patient was told to call if they had any further questions or any new or worsening symptoms.     -------------    - Work note written and send to patient via email.     - Called [facility]. Waited on hold for 30 minutes before I was connected with pharmacy staff. I asked if patient would be able to pay out of pocket for this. They said his plan would not allow him to pay out of pocket and an appeal is needed to be done in order to fill.     [employer]: [phone], ID #: [id number]    - Number was provided by pharmacy was for pharmacy help desk. They gave me another number to call:   [phone] and [phone].     - Call placed to [phone]     - They said provider would need to submit prior authorization. There has been no denial prior authorization processed on there end, so there has been no denial.     Prior auth request submitted in another encounter.

## 2025-07-21 NOTE — TELEPHONE ENCOUNTER
Central Prior Authorization Team   Phone: [phone]    PA Initiation    Medication: oxyCODONE (ROXICODONE) 5 MG tablet  Insurance Company: [employer] - Phone [phone] Fax [phone]  Pharmacy Filling the Rx: [id number] - [facility], [place] - [address] AT [facility] OF  [address]  Filling Pharmacy Phone: [phone]  Filling Pharmacy Fax:    Start Date: 7/21/2025

## 2025-07-21 NOTE — TELEPHONE ENCOUNTER
Form or Letter   Type or form/letter needing completion: Workability Note   Provider: Trey Winkler PA-C   Date form needed: 7/21/25  Once completed: Patient would like it emailed to him at [email or web address]. Patient also needs the provider to Authorization for the oxyCODONE (ROXICODONE) 5 MG tablets to his insurance.    Could we send this information to you in [place] [employer] or would you prefer to receive a phone call?:   Patient would prefer a phone call   Okay to leave a detailed message?: Yes at Cell number on file:    Telephone Information:   Mobile [phone]

## 2025-07-21 NOTE — TELEPHONE ENCOUNTER
Prior Authorization Retail Medication Request    Medication/Dose: oxyCODONE (ROXICODONE) 5 MG tablet  Diagnosis and ICD code (if different than what is on RX):  same as on RX  New/renewal/insurance change PA/secondary ins. PA:  Previously Tried and Failed:  No, prior auth not previously needed  Rationale:  Prior auth needed by pharmacy to fill    Insurance   Primary: See chart  Insurance ID:  See chart    Secondary (if applicable):n/a  Insurance ID:  n/a    Pharmacy Information (if different than what is on RX)  Name:  n/a  Phone:  n/a  Fax:n/a    Clinic Information  Preferred routing pool for dept communication: [facility]

## 2025-07-21 NOTE — TELEPHONE ENCOUNTER
Prior Authorization Approval    Authorization Effective Date: 6/21/2025  Authorization Expiration Date: 7/21/2026  Medication: oxyCODONE (ROXICODONE) 5 MG tablet  Approved Dose/Quantity:   Reference #:     Insurance Company: [employer] - Phone [phone] Fax [phone]  Expected CoPay:       CoPay Card Available:      Foundation Assistance Needed:    Which Pharmacy is filling the prescription (Not needed for infusion/clinic administered): [id number] - [facility], [place] - [address] [place] [address] AT [facility] [address]  Pharmacy Notified:  yes  Patient Notified:  yes- Pharmacy will contact patient when ready to /ship

## 2025-07-22 ENCOUNTER — THERAPY VISIT (OUTPATIENT)
Dept: PHYSICAL THERAPY | Facility: CLINIC | Age: 45
End: 2025-07-22
Payer: COMMERCIAL

## 2025-07-22 DIAGNOSIS — Z96.641 STATUS POST RIGHT HIP REPLACEMENT: Primary | ICD-10-CM

## 2025-07-22 PROCEDURE — 97110 THERAPEUTIC EXERCISES: CPT | Mod: GP | Performed by: PHYSICAL THERAPIST

## 2025-07-22 PROCEDURE — 97530 THERAPEUTIC ACTIVITIES: CPT | Mod: GP | Performed by: PHYSICAL THERAPIST

## 2025-07-22 PROCEDURE — 97112 NEUROMUSCULAR REEDUCATION: CPT | Mod: GP | Performed by: PHYSICAL THERAPIST

## 2025-07-31 ENCOUNTER — THERAPY VISIT (OUTPATIENT)
Dept: PHYSICAL THERAPY | Facility: CLINIC | Age: 45
End: 2025-07-31
Payer: COMMERCIAL

## 2025-07-31 DIAGNOSIS — Z96.641 STATUS POST RIGHT HIP REPLACEMENT: Primary | ICD-10-CM

## 2025-08-14 ENCOUNTER — THERAPY VISIT (OUTPATIENT)
Dept: PHYSICAL THERAPY | Facility: CLINIC | Age: 45
End: 2025-08-14
Payer: COMMERCIAL

## 2025-08-14 DIAGNOSIS — Z96.641 STATUS POST RIGHT HIP REPLACEMENT: Primary | ICD-10-CM

## 2025-08-26 ENCOUNTER — TELEPHONE (OUTPATIENT)
Dept: ORTHOPEDICS | Facility: CLINIC | Age: 45
End: 2025-08-26
Payer: COMMERCIAL

## (undated) DEVICE — GLOVE PROTEXIS POWDER FREE ORANGE 7.0  2D72PT70X

## (undated) DEVICE — VIAL DECANTER STERILE WHITE DYNJDEC06

## (undated) DEVICE — STRAP STIRRUP W/SLIP 30187-030

## (undated) DEVICE — SOL WATER IRRIG 1000ML BOTTLE 2F7114

## (undated) DEVICE — STRAP POSITIONING 60X31" BODY KNEE KBS 01

## (undated) DEVICE — PREP POVIDONE-IODINE 7.5% SCRUB 4OZ BOTTLE MDS093945

## (undated) DEVICE — Device

## (undated) DEVICE — PACK TOTAL HIP W/POUCH RIVERSIDE LATEX FREE

## (undated) DEVICE — SPONGE LAP 18X18" X8435

## (undated) DEVICE — DRSG SILVERCEL 4.25X4.25" 900404

## (undated) DEVICE — DECANTER TRANSFER DEVICE 2008S

## (undated) DEVICE — PREP DURAPREP REMOVER 4OZ 8611

## (undated) DEVICE — SU DERMABOND ADVANCED .7ML DNX12

## (undated) DEVICE — SU VICRYL 2-0 CT-1 27" UND J259H

## (undated) DEVICE — GLOVE BIOGEL PI MICRO INDICATOR UNDERGLOVE SZ 7.5 48975

## (undated) DEVICE — POSITIONER ABDUCTION PILLOW FOAM MED APG 202

## (undated) DEVICE — DRSG TEGADERM 4X4 3/4" 1626W

## (undated) DEVICE — SU VICRYL 0 CTX 36" J370H

## (undated) DEVICE — SU VICRYL+ 2-0 CT 36IN UND VCP957H

## (undated) DEVICE — TUBING EXTENSION SET 6" W/THREE-WAY STOPCOCK MX43660

## (undated) DEVICE — KIT BONE MARROW ASPIRATION CONC 51423 120ML BMAC2-120-01

## (undated) DEVICE — LINEN MAYO STAND COVER OVERSIZE PACK 5458

## (undated) DEVICE — DRAPE STOCKINETTE 8" 8586

## (undated) DEVICE — SU SILK 2-0 SH 30" K833H

## (undated) DEVICE — DRSG TEGADERM ALGINATE AG 4X5" 90303

## (undated) DEVICE — DRAPE IOBAN INCISE 36X23" 6651EZ

## (undated) DEVICE — DRAPE MAYO STAND 23X54 8337

## (undated) DEVICE — SYR 20ML LL W/O NDL 302830

## (undated) DEVICE — DRAPE C-ARM W/STRAPS 42X72" 07-CA104

## (undated) DEVICE — LINEN TOWEL PACK X5 5464

## (undated) DEVICE — PACK TOTAL HIP W/U DRAPE RIVERSIDE LATEX FREE

## (undated) DEVICE — SOLUTION WATER 1000ML BOTTLE R5000-01

## (undated) DEVICE — TRAY PREP DRY SKIN SCRUB 067

## (undated) DEVICE — PREP DURAPREP 26ML APL 8630

## (undated) DEVICE — SUCTION MANIFOLD NEPTUNE 2 SYS 4 PORT 0702-020-000

## (undated) DEVICE — SU ETHIBOND 1 CTX CR 8/18" CX30D

## (undated) DEVICE — BLADE KNIFE SURG 11 371111

## (undated) DEVICE — SU PDS II 3-0 PS-2 18" Z497G

## (undated) DEVICE — TAPE MICROFOAM 4" 1528-4

## (undated) DEVICE — ESU GROUND PAD UNIVERSAL W/O CORD

## (undated) DEVICE — SU VICRYL 0 CT-1 27" J340H

## (undated) DEVICE — SOL NACL 0.9% IRRIG 1000ML BOTTLE 2F7124

## (undated) DEVICE — LINEN GOWN OVERSIZE 5408

## (undated) DEVICE — GLOVE BIOGEL PI ULTRATOUCH SZ 7.0 41170

## (undated) DEVICE — DRSG STERI STRIP 1/2X4" R1547

## (undated) DEVICE — SU PDS II 3-0 PS-1 18" Z683G

## (undated) DEVICE — LINEN BACK PACK 5440

## (undated) DEVICE — ESU ELEC BLADE 6" COATED/INSULATED E1455-6

## (undated) DEVICE — DRSG TEGADERM 4X10" 1627

## (undated) DEVICE — SOLUTION IRRIGATION 0.9% NACL 1000ML BOTTLE R5200-01

## (undated) DEVICE — GOWN IMPERVIOUS SPECIALTY XLG/XLONG 32474

## (undated) DEVICE — LINEN ORTHO PACK 5446

## (undated) DEVICE — NDL BX BONE MARROW 8GA X 25CM RAN-825CM

## (undated) DEVICE — SU MONOCRYL 3-0 PS-1 27" Y936H

## (undated) DEVICE — BLADE SAW SAGITTAL STRK 25X90X1.27MM HD SYS 6 6125-127-090

## (undated) DEVICE — BLADE KNIFE SURG 15 371115

## (undated) DEVICE — GLOVE PROTEXIS BLUE W/NEU-THERA 7.5  2D73EB75

## (undated) RX ORDER — FENTANYL CITRATE 50 UG/ML
INJECTION, SOLUTION INTRAMUSCULAR; INTRAVENOUS
Status: DISPENSED
Start: 2024-08-30

## (undated) RX ORDER — HEPARIN SODIUM 10000 [USP'U]/ML
INJECTION, SOLUTION INTRAVENOUS; SUBCUTANEOUS
Status: DISPENSED
Start: 2024-08-30

## (undated) RX ORDER — ONDANSETRON 2 MG/ML
INJECTION INTRAMUSCULAR; INTRAVENOUS
Status: DISPENSED
Start: 2025-06-24

## (undated) RX ORDER — HYDROXYZINE HYDROCHLORIDE 25 MG/1
TABLET, FILM COATED ORAL
Status: DISPENSED
Start: 2025-06-24

## (undated) RX ORDER — OXYCODONE HYDROCHLORIDE 5 MG/1
TABLET ORAL
Status: DISPENSED
Start: 2024-08-30

## (undated) RX ORDER — CEFAZOLIN SODIUM/WATER 2 G/20 ML
SYRINGE (ML) INTRAVENOUS
Status: DISPENSED
Start: 2025-06-24

## (undated) RX ORDER — FENTANYL CITRATE-0.9 % NACL/PF 10 MCG/ML
PLASTIC BAG, INJECTION (ML) INTRAVENOUS
Status: DISPENSED
Start: 2025-06-24

## (undated) RX ORDER — GLYCOPYRROLATE 0.2 MG/ML
INJECTION, SOLUTION INTRAMUSCULAR; INTRAVENOUS
Status: DISPENSED
Start: 2024-08-30

## (undated) RX ORDER — CELECOXIB 200 MG/1
CAPSULE ORAL
Status: DISPENSED
Start: 2025-06-24

## (undated) RX ORDER — HYDROMORPHONE HYDROCHLORIDE 1 MG/ML
INJECTION, SOLUTION INTRAMUSCULAR; INTRAVENOUS; SUBCUTANEOUS
Status: DISPENSED
Start: 2025-06-24

## (undated) RX ORDER — ACETAMINOPHEN 325 MG/1
TABLET ORAL
Status: DISPENSED
Start: 2024-08-30

## (undated) RX ORDER — FENTANYL CITRATE 50 UG/ML
INJECTION, SOLUTION INTRAMUSCULAR; INTRAVENOUS
Status: DISPENSED
Start: 2025-06-24

## (undated) RX ORDER — TRANEXAMIC ACID 650 MG/1
TABLET ORAL
Status: DISPENSED
Start: 2025-06-24

## (undated) RX ORDER — PROPOFOL 10 MG/ML
INJECTION, EMULSION INTRAVENOUS
Status: DISPENSED
Start: 2025-06-24

## (undated) RX ORDER — ACETAMINOPHEN 325 MG/1
TABLET ORAL
Status: DISPENSED
Start: 2025-06-24

## (undated) RX ORDER — DEXAMETHASONE SODIUM PHOSPHATE 4 MG/ML
INJECTION, SOLUTION INTRA-ARTICULAR; INTRALESIONAL; INTRAMUSCULAR; INTRAVENOUS; SOFT TISSUE
Status: DISPENSED
Start: 2024-08-30

## (undated) RX ORDER — CEFAZOLIN SODIUM/WATER 2 G/20 ML
SYRINGE (ML) INTRAVENOUS
Status: DISPENSED
Start: 2024-08-30

## (undated) RX ORDER — PROPOFOL 10 MG/ML
INJECTION, EMULSION INTRAVENOUS
Status: DISPENSED
Start: 2024-08-30

## (undated) RX ORDER — ONDANSETRON 2 MG/ML
INJECTION INTRAMUSCULAR; INTRAVENOUS
Status: DISPENSED
Start: 2024-08-30